# Patient Record
Sex: MALE | Race: WHITE
[De-identification: names, ages, dates, MRNs, and addresses within clinical notes are randomized per-mention and may not be internally consistent; named-entity substitution may affect disease eponyms.]

---

## 2017-08-17 ENCOUNTER — HOSPITAL ENCOUNTER (EMERGENCY)
Dept: HOSPITAL 23 - SED | Age: 77
LOS: 1 days | Discharge: HOME | End: 2017-08-18
Payer: MEDICARE

## 2017-08-17 VITALS — WEIGHT: 204.99 LBS | BODY MASS INDEX: 27.17 KG/M2 | HEIGHT: 73 IN

## 2017-08-17 DIAGNOSIS — E78.5: ICD-10-CM

## 2017-08-17 DIAGNOSIS — N41.9: Primary | ICD-10-CM

## 2017-08-17 DIAGNOSIS — F41.9: ICD-10-CM

## 2017-08-17 DIAGNOSIS — K21.9: ICD-10-CM

## 2017-08-17 LAB
BASOPHIL#: 0.1 X10E3 (ref 0–0.3)
BASOPHIL%: 0.7 % (ref 0–2.5)
BLOOD UREA NITROGEN: 24 MG/DL (ref 9–23)
BUN/CREATININE RATIO: 14.11
CALCIUM SERUM: 9.5 MG/DL (ref 8.4–10.2)
CK MB SERPL-RTO: 15 % (ref 11–15.5)
CK MB SERPL-RTO: 33.6 G/DL (ref 30–36)
CREATININE SERUM: 1.7 MG/DL (ref 0.6–1.4)
DIFF IND: NO
EOSINOPHIL#: 0.3 X10E3 (ref 0–0.7)
EOSINOPHIL%: 1.6 % (ref 0–7)
GENTAMICIN PEAK SERPL-MCNC: YES MG/L
GLOM FILT RATE ESTIMATED: 38.6 ML/MIN (ref 60–?)
GLUCOSE FASTING: 141 MG/DL (ref 70–110)
HEMATOCRIT: 35.9 % (ref 38–50)
HEMOGLOBIN: 12.1 GM/DL (ref 13–16)
KETONES UR QL: 103 MMOL/L (ref 100–111)
KETONES UR QL: 22 MMOL/L (ref 22–31)
LYMPHOCYTE#: 2 X10E3 (ref 1–3.5)
LYMPHOCYTE%: 11.6 % (ref 17–45)
MEAN CELL VOLUME: 90.2 FL (ref 83–96)
MEAN CORPUSCULAR HEMOGLOBIN: 30.3 PG (ref 28–34)
MEAN PLATELET VOLUME: 7.8 FL (ref 6.5–11.5)
MICRO INDICATED?: YES
MONOCYTE#: 1 X10E3 (ref 0–1)
MONOCYTE%: 5.7 % (ref 3–12)
NEUTROPHIL#: 13.7 X10E3 (ref 1.5–7.1)
NEUTROPHIL%: 80.4 % (ref 40–75)
PLATELET COUNT: 225 X10E3 (ref 140–420)
POTASSIUM: 4.3 MMOL/L (ref 3.5–5.1)
RED BLOOD COUNT: 3.98 X10E (ref 3.9–5.6)
SODIUM: 136 MMOL/L (ref 135–145)
URINE APPEARANCE: (no result)
URINE BACTERIA: (no result)
URINE BILIRUBIN: (no result)
URINE BLOOD: (no result)
URINE COLOR: YELLOW
URINE GLUCOSE: (no result) MG/DL
URINE KETONE: (no result)
URINE LEUKOCYTE ESTERASE: (no result)
URINE MUCUS: PRESENT
URINE NITRATE: (no result)
URINE PH: 5 (ref 5–8)
URINE PROTEIN: (no result)
URINE SOURCE: (no result)
URINE SPECIFIC GRAVITY: >=1.03 (ref 1–1.03)
URINE SQUAMOUS EPITHELIAL CELL: (no result) /[HPF]
URINE TRANSITIONAL EPI CELLS: (no result) /[HPF]
URINE UROBILINOGEN: 0.2 MG/DL
URINE WBC: (no result) /[HPF] (ref 0–5)
WHITE BLOOD COUNT: 17.1 X10E3 (ref 4–10.5)

## 2017-08-18 ENCOUNTER — HOSPITAL ENCOUNTER (EMERGENCY)
Dept: HOSPITAL 23 - SED | Age: 77
End: 2017-08-18
Payer: MEDICARE

## 2017-08-18 DIAGNOSIS — Z79.899: ICD-10-CM

## 2017-08-18 DIAGNOSIS — E78.00: ICD-10-CM

## 2017-08-18 DIAGNOSIS — J44.9: ICD-10-CM

## 2017-08-18 DIAGNOSIS — N41.9: ICD-10-CM

## 2017-08-18 DIAGNOSIS — N39.0: Primary | ICD-10-CM

## 2017-08-18 DIAGNOSIS — K21.9: ICD-10-CM

## 2017-08-18 LAB
BASOPHIL#: 0.1 X10E3
BASOPHIL%: 0.5 %
BLOOD UREA NITROGEN: 23 MG/DL
BUN/CREATININE RATIO: 12.1
CALCIUM SERUM: 9.1 MG/DL
CK MB SERPL-RTO: 14.9 %
CK MB SERPL-RTO: 33.7 G/DL
CREATININE SERUM: 1.9 MG/DL
DIFF IND: NO
EOSINOPHIL#: 0.2 X10E3
EOSINOPHIL%: 1.2 %
GENTAMICIN PEAK SERPL-MCNC: YES MG/L
GLOM FILT RATE ESTIMATED: 33.7 ML/MIN
GLUCOSE FASTING: 144 MG/DL
HEMATOCRIT: 33.5 %
HEMOGLOBIN: 11.3 GM/DL
KETONES UR QL: 105 MMOL/L
KETONES UR QL: 23 MMOL/L
LYMPHOCYTE#: 1.3 X10E3
LYMPHOCYTE%: 7.4 %
MEAN CELL VOLUME: 89.7 FL
MEAN CORPUSCULAR HEMOGLOBIN: 30.2 PG
MEAN PLATELET VOLUME: 8 FL
MICRO INDICATED?: YES
MONOCYTE#: 0.9 X10E3
MONOCYTE%: 5.4 %
NEUTROPHIL#: 14.9 X10E3
NEUTROPHIL%: 85.5 %
PLATELET COUNT: 215 X10E3
POTASSIUM: 4.2 MMOL/L
RED BLOOD COUNT: 3.73 X10E
SODIUM: 135 MMOL/L
URINE APPEARANCE: (no result)
URINE BACTERIA: (no result)
URINE BILIRUBIN: (no result)
URINE BLOOD: (no result)
URINE COLOR: YELLOW
URINE GLUCOSE: (no result) MG/DL
URINE KETONE: (no result)
URINE LEUKOCYTE ESTERASE: (no result)
URINE NITRATE: (no result)
URINE PH: 5
URINE PROTEIN: (no result)
URINE RBC: (no result) /[HPF]
URINE SOURCE: (no result)
URINE SPECIFIC GRAVITY: >=1.03
URINE SQUAMOUS EPITHELIAL CELL: (no result) /[HPF]
URINE UROBILINOGEN: 0.2 MG/DL
URINE WBC: (no result) /[HPF]
WHITE BLOOD COUNT: 17.4 X10E3

## 2017-09-19 ENCOUNTER — HOSPITAL ENCOUNTER (OUTPATIENT)
Dept: HOSPITAL 23 - SGUS | Age: 77
Discharge: HOME | End: 2017-09-19
Attending: FAMILY MEDICINE
Payer: MEDICARE

## 2017-09-19 DIAGNOSIS — N39.0: Primary | ICD-10-CM

## 2017-11-30 ENCOUNTER — OFFICE VISIT (OUTPATIENT)
Dept: CARDIOLOGY | Facility: CLINIC | Age: 77
End: 2017-11-30

## 2017-11-30 VITALS — DIASTOLIC BLOOD PRESSURE: 82 MMHG | SYSTOLIC BLOOD PRESSURE: 142 MMHG | WEIGHT: 200 LBS | HEART RATE: 69 BPM

## 2017-11-30 DIAGNOSIS — I25.10 CORONARY ARTERY DISEASE INVOLVING NATIVE HEART WITHOUT ANGINA PECTORIS, UNSPECIFIED VESSEL OR LESION TYPE: Primary | ICD-10-CM

## 2017-11-30 DIAGNOSIS — E78.5 HYPERLIPIDEMIA, UNSPECIFIED HYPERLIPIDEMIA TYPE: ICD-10-CM

## 2017-11-30 DIAGNOSIS — R06.02 SOB (SHORTNESS OF BREATH): ICD-10-CM

## 2017-11-30 DIAGNOSIS — Z72.0 TOBACCO ABUSE: ICD-10-CM

## 2017-11-30 PROCEDURE — 93000 ELECTROCARDIOGRAM COMPLETE: CPT | Performed by: INTERNAL MEDICINE

## 2017-11-30 PROCEDURE — 99204 OFFICE O/P NEW MOD 45 MIN: CPT | Performed by: INTERNAL MEDICINE

## 2017-11-30 RX ORDER — CLOPIDOGREL BISULFATE 75 MG/1
TABLET ORAL
COMMUNITY
Start: 2017-11-02

## 2017-11-30 RX ORDER — QUETIAPINE FUMARATE 100 MG/1
TABLET, FILM COATED ORAL
COMMUNITY
Start: 2017-11-20

## 2017-11-30 RX ORDER — ATORVASTATIN CALCIUM 80 MG/1
TABLET, FILM COATED ORAL
COMMUNITY
Start: 2017-09-25

## 2017-11-30 RX ORDER — ISOSORBIDE MONONITRATE 30 MG/1
TABLET, EXTENDED RELEASE ORAL
COMMUNITY
Start: 2017-11-24

## 2017-11-30 RX ORDER — PAROXETINE HYDROCHLORIDE 20 MG/1
TABLET, FILM COATED ORAL
COMMUNITY
Start: 2017-11-20

## 2017-11-30 RX ORDER — OMEPRAZOLE 40 MG/1
CAPSULE, DELAYED RELEASE ORAL
COMMUNITY
Start: 2017-11-09

## 2017-11-30 RX ORDER — LEVOTHYROXINE SODIUM 0.03 MG/1
TABLET ORAL
COMMUNITY
Start: 2017-11-13

## 2017-12-13 PROBLEM — R06.02 SOB (SHORTNESS OF BREATH): Status: ACTIVE | Noted: 2017-12-13

## 2018-01-02 ENCOUNTER — APPOINTMENT (OUTPATIENT)
Dept: CARDIOLOGY | Facility: HOSPITAL | Age: 78
End: 2018-01-02
Attending: INTERNAL MEDICINE

## 2023-09-29 ENCOUNTER — HOSPITAL ENCOUNTER (INPATIENT)
Facility: HOSPITAL | Age: 83
LOS: 6 days | Discharge: HOME | DRG: 321 | End: 2023-10-05
Attending: INTERNAL MEDICINE | Admitting: INTERNAL MEDICINE
Payer: MEDICARE

## 2023-09-29 ENCOUNTER — APPOINTMENT (OUTPATIENT)
Dept: GENERAL RADIOLOGY | Facility: HOSPITAL | Age: 83
DRG: 321 | End: 2023-09-29
Payer: MEDICARE

## 2023-09-29 DIAGNOSIS — I21.4 NSTEMI (NON-ST ELEVATED MYOCARDIAL INFARCTION): Primary | ICD-10-CM

## 2023-09-29 LAB
ACT BLD: 173 SECONDS (ref 82–152)
ACT BLD: 191 SECONDS (ref 82–152)
ANION GAP SERPL CALCULATED.3IONS-SCNC: 8.3 MMOL/L (ref 5–15)
APTT PPP: 44.4 SECONDS (ref 22.7–35.4)
APTT PPP: 89.8 SECONDS (ref 22.7–35.4)
APTT PPP: 91 SECONDS (ref 22.7–35.4)
ARTERIAL PATENCY WRIST A: POSITIVE
ATMOSPHERIC PRESS: 749.8 MMHG
BASE EXCESS BLDA CALC-SCNC: -4.7 MMOL/L (ref 0–2)
BASOPHILS # BLD AUTO: 0.03 10*3/MM3 (ref 0–0.2)
BASOPHILS NFR BLD AUTO: 0.3 % (ref 0–1.5)
BDY SITE: ABNORMAL
BUN SERPL-MCNC: 31 MG/DL (ref 8–23)
BUN/CREAT SERPL: 12.9 (ref 7–25)
CALCIUM SPEC-SCNC: 9.4 MG/DL (ref 8.6–10.5)
CHLORIDE SERPL-SCNC: 112 MMOL/L (ref 98–107)
CO2 BLDA-SCNC: 22.2 MMOL/L (ref 23–27)
CO2 SERPL-SCNC: 22.7 MMOL/L (ref 22–29)
CREAT BLDA-MCNC: 2.05 MG/DL (ref 0.6–130)
CREAT SERPL-MCNC: 2.4 MG/DL (ref 0.76–1.27)
DEPRECATED RDW RBC AUTO: 50.2 FL (ref 37–54)
DEVICE COMMENT: ABNORMAL
DEVICE COMMENT: ABNORMAL
EGFRCR SERPLBLD CKD-EPI 2021: 26.4 ML/MIN/1.73
EGFRCR SERPLBLD CKD-EPI 2021: 32 ML/MIN/1.73
EOSINOPHIL # BLD AUTO: 0.46 10*3/MM3 (ref 0–0.4)
EOSINOPHIL NFR BLD AUTO: 5.2 % (ref 0.3–6.2)
ERYTHROCYTE [DISTWIDTH] IN BLOOD BY AUTOMATED COUNT: 15.7 % (ref 12.3–15.4)
GAS FLOW AIRWAY: 15 LPM
GEN 5 2HR TROPONIN T REFLEX: 146 NG/L
GLUCOSE SERPL-MCNC: 85 MG/DL (ref 65–99)
HCO3 BLDA-SCNC: 21 MMOL/L (ref 22–28)
HCT VFR BLD AUTO: 31.2 % (ref 37.5–51)
HEMODILUTION: NO
HGB BLD-MCNC: 10 G/DL (ref 13–17.7)
IMM GRANULOCYTES # BLD AUTO: 0.02 10*3/MM3 (ref 0–0.05)
IMM GRANULOCYTES NFR BLD AUTO: 0.2 % (ref 0–0.5)
INR PPP: 1.1 (ref 0.9–1.1)
LYMPHOCYTES # BLD AUTO: 3.21 10*3/MM3 (ref 0.7–3.1)
LYMPHOCYTES NFR BLD AUTO: 36.4 % (ref 19.6–45.3)
MCH RBC QN AUTO: 28.2 PG (ref 26.6–33)
MCHC RBC AUTO-ENTMCNC: 32.1 G/DL (ref 31.5–35.7)
MCV RBC AUTO: 88.1 FL (ref 79–97)
MODALITY: ABNORMAL
MONOCYTES # BLD AUTO: 0.59 10*3/MM3 (ref 0.1–0.9)
MONOCYTES NFR BLD AUTO: 6.7 % (ref 5–12)
NEUTROPHILS NFR BLD AUTO: 4.52 10*3/MM3 (ref 1.7–7)
NEUTROPHILS NFR BLD AUTO: 51.2 % (ref 42.7–76)
NRBC BLD AUTO-RTO: 0 /100 WBC (ref 0–0.2)
PCO2 BLDA: 40 MM HG (ref 35–45)
PH BLDA: 7.33 PH UNITS (ref 7.35–7.45)
PLATELET # BLD AUTO: 207 10*3/MM3 (ref 140–450)
PMV BLD AUTO: 10.3 FL (ref 6–12)
PO2 BLDA: 165.1 MM HG (ref 80–100)
POTASSIUM SERPL-SCNC: 4.4 MMOL/L (ref 3.5–5.2)
PROTHROMBIN TIME: 14.3 SECONDS (ref 11.7–14.2)
QT INTERVAL: 366 MS
QT INTERVAL: 431 MS
QTC INTERVAL: 420 MS
QTC INTERVAL: 459 MS
RBC # BLD AUTO: 3.54 10*6/MM3 (ref 4.14–5.8)
SAO2 % BLDCOA: 99.4 % (ref 92–98.5)
SET MECH RESP RATE: 24
SODIUM SERPL-SCNC: 143 MMOL/L (ref 136–145)
TROPONIN T DELTA: 25 NG/L
TROPONIN T SERPL HS-MCNC: 121 NG/L
WBC NRBC COR # BLD: 8.83 10*3/MM3 (ref 3.4–10.8)

## 2023-09-29 PROCEDURE — C1894 INTRO/SHEATH, NON-LASER: HCPCS | Performed by: INTERNAL MEDICINE

## 2023-09-29 PROCEDURE — 82565 ASSAY OF CREATININE: CPT

## 2023-09-29 PROCEDURE — 93005 ELECTROCARDIOGRAM TRACING: CPT | Performed by: INTERNAL MEDICINE

## 2023-09-29 PROCEDURE — 85347 COAGULATION TIME ACTIVATED: CPT

## 2023-09-29 PROCEDURE — 94799 UNLISTED PULMONARY SVC/PX: CPT

## 2023-09-29 PROCEDURE — 85025 COMPLETE CBC W/AUTO DIFF WBC: CPT | Performed by: INTERNAL MEDICINE

## 2023-09-29 PROCEDURE — 25010000002 FUROSEMIDE PER 20 MG

## 2023-09-29 PROCEDURE — 84484 ASSAY OF TROPONIN QUANT: CPT | Performed by: INTERNAL MEDICINE

## 2023-09-29 PROCEDURE — 25010000002 HEPARIN (PORCINE) PER 1000 UNITS: Performed by: INTERNAL MEDICINE

## 2023-09-29 PROCEDURE — 93458 L HRT ARTERY/VENTRICLE ANGIO: CPT | Performed by: INTERNAL MEDICINE

## 2023-09-29 PROCEDURE — 93010 ELECTROCARDIOGRAM REPORT: CPT | Performed by: INTERNAL MEDICINE

## 2023-09-29 PROCEDURE — C1769 GUIDE WIRE: HCPCS | Performed by: INTERNAL MEDICINE

## 2023-09-29 PROCEDURE — 4A023N7 MEASUREMENT OF CARDIAC SAMPLING AND PRESSURE, LEFT HEART, PERCUTANEOUS APPROACH: ICD-10-PCS | Performed by: INTERNAL MEDICINE

## 2023-09-29 PROCEDURE — 99223 1ST HOSP IP/OBS HIGH 75: CPT | Performed by: INTERNAL MEDICINE

## 2023-09-29 PROCEDURE — 85730 THROMBOPLASTIN TIME PARTIAL: CPT | Performed by: INTERNAL MEDICINE

## 2023-09-29 PROCEDURE — 25010000002 FUROSEMIDE PER 20 MG: Performed by: INTERNAL MEDICINE

## 2023-09-29 PROCEDURE — 25510000001 IOPAMIDOL PER 1 ML: Performed by: INTERNAL MEDICINE

## 2023-09-29 PROCEDURE — 25010000002 MIDAZOLAM PER 1 MG: Performed by: INTERNAL MEDICINE

## 2023-09-29 PROCEDURE — 36600 WITHDRAWAL OF ARTERIAL BLOOD: CPT

## 2023-09-29 PROCEDURE — 80048 BASIC METABOLIC PNL TOTAL CA: CPT | Performed by: INTERNAL MEDICINE

## 2023-09-29 PROCEDURE — 94640 AIRWAY INHALATION TREATMENT: CPT

## 2023-09-29 PROCEDURE — 25010000002 FENTANYL CITRATE (PF) 50 MCG/ML SOLUTION: Performed by: INTERNAL MEDICINE

## 2023-09-29 PROCEDURE — 94761 N-INVAS EAR/PLS OXIMETRY MLT: CPT

## 2023-09-29 PROCEDURE — 71045 X-RAY EXAM CHEST 1 VIEW: CPT

## 2023-09-29 PROCEDURE — 25010000002 HEPARIN (PORCINE) 25000-0.45 UT/250ML-% SOLUTION: Performed by: INTERNAL MEDICINE

## 2023-09-29 PROCEDURE — 85610 PROTHROMBIN TIME: CPT | Performed by: INTERNAL MEDICINE

## 2023-09-29 PROCEDURE — 80299 QUANTITATIVE ASSAY DRUG: CPT | Performed by: NURSE PRACTITIONER

## 2023-09-29 PROCEDURE — 82803 BLOOD GASES ANY COMBINATION: CPT

## 2023-09-29 RX ORDER — HEPARIN SODIUM 5000 [USP'U]/ML
30-60 INJECTION, SOLUTION INTRAVENOUS; SUBCUTANEOUS EVERY 6 HOURS PRN
Status: DISCONTINUED | OUTPATIENT
Start: 2023-09-29 | End: 2023-09-29

## 2023-09-29 RX ORDER — POLYETHYLENE GLYCOL 3350 17 G/17G
17 POWDER, FOR SOLUTION ORAL DAILY PRN
Status: DISCONTINUED | OUTPATIENT
Start: 2023-09-29 | End: 2023-10-05 | Stop reason: HOSPADM

## 2023-09-29 RX ORDER — ISOSORBIDE MONONITRATE 30 MG/1
30 TABLET, EXTENDED RELEASE ORAL DAILY
Status: DISCONTINUED | OUTPATIENT
Start: 2023-09-29 | End: 2023-10-05 | Stop reason: HOSPADM

## 2023-09-29 RX ORDER — FUROSEMIDE 10 MG/ML
80 INJECTION INTRAMUSCULAR; INTRAVENOUS ONCE
Status: COMPLETED | OUTPATIENT
Start: 2023-09-29 | End: 2023-09-29

## 2023-09-29 RX ORDER — HEPARIN SODIUM 1000 [USP'U]/ML
INJECTION, SOLUTION INTRAVENOUS; SUBCUTANEOUS
Status: DISCONTINUED | OUTPATIENT
Start: 2023-09-29 | End: 2023-09-29 | Stop reason: HOSPADM

## 2023-09-29 RX ORDER — ALPRAZOLAM 0.5 MG/1
0.5 TABLET ORAL 2 TIMES DAILY PRN
Status: DISCONTINUED | OUTPATIENT
Start: 2023-09-29 | End: 2023-10-05 | Stop reason: HOSPADM

## 2023-09-29 RX ORDER — LEVOTHYROXINE SODIUM 0.03 MG/1
25 TABLET ORAL DAILY
Status: DISCONTINUED | OUTPATIENT
Start: 2023-09-29 | End: 2023-10-05 | Stop reason: HOSPADM

## 2023-09-29 RX ORDER — IPRATROPIUM BROMIDE AND ALBUTEROL SULFATE 2.5; .5 MG/3ML; MG/3ML
3 SOLUTION RESPIRATORY (INHALATION) ONCE
Status: COMPLETED | OUTPATIENT
Start: 2023-09-29 | End: 2023-09-29

## 2023-09-29 RX ORDER — VERAPAMIL HYDROCHLORIDE 2.5 MG/ML
INJECTION, SOLUTION INTRAVENOUS
Status: DISCONTINUED | OUTPATIENT
Start: 2023-09-29 | End: 2023-09-29 | Stop reason: HOSPADM

## 2023-09-29 RX ORDER — ASPIRIN 81 MG/1
81 TABLET ORAL DAILY
COMMUNITY

## 2023-09-29 RX ORDER — FUROSEMIDE 10 MG/ML
80 INJECTION INTRAMUSCULAR; INTRAVENOUS ONCE
Status: DISCONTINUED | OUTPATIENT
Start: 2023-09-29 | End: 2023-09-29

## 2023-09-29 RX ORDER — SODIUM CHLORIDE 0.9 % (FLUSH) 0.9 %
10 SYRINGE (ML) INJECTION AS NEEDED
Status: DISCONTINUED | OUTPATIENT
Start: 2023-09-29 | End: 2023-10-05 | Stop reason: HOSPADM

## 2023-09-29 RX ORDER — AMLODIPINE BESYLATE 5 MG/1
5 TABLET ORAL DAILY
COMMUNITY
End: 2023-10-05 | Stop reason: HOSPADM

## 2023-09-29 RX ORDER — HYDROCODONE BITARTRATE AND ACETAMINOPHEN 5; 325 MG/1; MG/1
1 TABLET ORAL EVERY 4 HOURS PRN
Status: DISCONTINUED | OUTPATIENT
Start: 2023-09-29 | End: 2023-10-05 | Stop reason: HOSPADM

## 2023-09-29 RX ORDER — FENTANYL CITRATE 50 UG/ML
INJECTION, SOLUTION INTRAMUSCULAR; INTRAVENOUS
Status: DISCONTINUED | OUTPATIENT
Start: 2023-09-29 | End: 2023-09-29 | Stop reason: HOSPADM

## 2023-09-29 RX ORDER — BISACODYL 5 MG/1
5 TABLET, DELAYED RELEASE ORAL DAILY PRN
Status: DISCONTINUED | OUTPATIENT
Start: 2023-09-29 | End: 2023-10-05 | Stop reason: HOSPADM

## 2023-09-29 RX ORDER — SODIUM CHLORIDE 9 MG/ML
INJECTION, SOLUTION INTRAVENOUS
Status: DISCONTINUED | OUTPATIENT
Start: 2023-09-29 | End: 2023-09-29

## 2023-09-29 RX ORDER — FUROSEMIDE 10 MG/ML
INJECTION INTRAMUSCULAR; INTRAVENOUS
Status: COMPLETED
Start: 2023-09-29 | End: 2023-09-29

## 2023-09-29 RX ORDER — BISACODYL 10 MG
10 SUPPOSITORY, RECTAL RECTAL DAILY PRN
Status: DISCONTINUED | OUTPATIENT
Start: 2023-09-29 | End: 2023-10-05 | Stop reason: HOSPADM

## 2023-09-29 RX ORDER — PANTOPRAZOLE SODIUM 40 MG/1
40 TABLET, DELAYED RELEASE ORAL
Status: DISCONTINUED | OUTPATIENT
Start: 2023-09-30 | End: 2023-10-05 | Stop reason: HOSPADM

## 2023-09-29 RX ORDER — IPRATROPIUM BROMIDE AND ALBUTEROL SULFATE 2.5; .5 MG/3ML; MG/3ML
SOLUTION RESPIRATORY (INHALATION)
Status: COMPLETED
Start: 2023-09-29 | End: 2023-09-29

## 2023-09-29 RX ORDER — SODIUM CHLORIDE 9 MG/ML
250 INJECTION, SOLUTION INTRAVENOUS ONCE AS NEEDED
Status: DISCONTINUED | OUTPATIENT
Start: 2023-09-29 | End: 2023-10-05 | Stop reason: HOSPADM

## 2023-09-29 RX ORDER — ZOLPIDEM TARTRATE 10 MG/1
10 TABLET ORAL NIGHTLY
COMMUNITY

## 2023-09-29 RX ORDER — ONDANSETRON 4 MG/1
4 TABLET, FILM COATED ORAL EVERY 6 HOURS PRN
Status: DISCONTINUED | OUTPATIENT
Start: 2023-09-29 | End: 2023-10-05 | Stop reason: HOSPADM

## 2023-09-29 RX ORDER — NITROGLYCERIN 0.4 MG/1
TABLET SUBLINGUAL
Status: COMPLETED
Start: 2023-09-29 | End: 2023-09-29

## 2023-09-29 RX ORDER — LIDOCAINE HYDROCHLORIDE 20 MG/ML
INJECTION, SOLUTION INFILTRATION; PERINEURAL
Status: DISCONTINUED | OUTPATIENT
Start: 2023-09-29 | End: 2023-09-29 | Stop reason: HOSPADM

## 2023-09-29 RX ORDER — HEPARIN SODIUM 10000 [USP'U]/100ML
12 INJECTION, SOLUTION INTRAVENOUS
Status: DISCONTINUED | OUTPATIENT
Start: 2023-09-29 | End: 2023-09-29

## 2023-09-29 RX ORDER — MIDAZOLAM HYDROCHLORIDE 1 MG/ML
INJECTION INTRAMUSCULAR; INTRAVENOUS
Status: DISCONTINUED | OUTPATIENT
Start: 2023-09-29 | End: 2023-09-29 | Stop reason: HOSPADM

## 2023-09-29 RX ORDER — CARVEDILOL 6.25 MG/1
6.25 TABLET ORAL EVERY 12 HOURS SCHEDULED
Status: DISCONTINUED | OUTPATIENT
Start: 2023-09-29 | End: 2023-10-05 | Stop reason: HOSPADM

## 2023-09-29 RX ORDER — SODIUM CHLORIDE 9 MG/ML
100 INJECTION, SOLUTION INTRAVENOUS CONTINUOUS
Status: DISCONTINUED | OUTPATIENT
Start: 2023-09-29 | End: 2023-09-29

## 2023-09-29 RX ORDER — FUROSEMIDE 10 MG/ML
80 INJECTION INTRAMUSCULAR; INTRAVENOUS
Status: DISCONTINUED | OUTPATIENT
Start: 2023-09-29 | End: 2023-09-30

## 2023-09-29 RX ORDER — NITROGLYCERIN 0.4 MG/1
0.4 TABLET SUBLINGUAL
Status: DISCONTINUED | OUTPATIENT
Start: 2023-09-29 | End: 2023-10-05 | Stop reason: HOSPADM

## 2023-09-29 RX ORDER — ONDANSETRON 2 MG/ML
4 INJECTION INTRAMUSCULAR; INTRAVENOUS EVERY 6 HOURS PRN
Status: DISCONTINUED | OUTPATIENT
Start: 2023-09-29 | End: 2023-10-05 | Stop reason: HOSPADM

## 2023-09-29 RX ORDER — ATORVASTATIN CALCIUM 20 MG/1
40 TABLET, FILM COATED ORAL NIGHTLY
Status: DISCONTINUED | OUTPATIENT
Start: 2023-09-29 | End: 2023-10-05 | Stop reason: HOSPADM

## 2023-09-29 RX ORDER — AMLODIPINE BESYLATE 5 MG/1
5 TABLET ORAL DAILY
Status: DISCONTINUED | OUTPATIENT
Start: 2023-09-29 | End: 2023-09-30

## 2023-09-29 RX ORDER — ACETAMINOPHEN 325 MG/1
650 TABLET ORAL EVERY 4 HOURS PRN
Status: DISCONTINUED | OUTPATIENT
Start: 2023-09-29 | End: 2023-10-05 | Stop reason: HOSPADM

## 2023-09-29 RX ORDER — FUROSEMIDE 10 MG/ML
40 INJECTION INTRAMUSCULAR; INTRAVENOUS ONCE
Status: DISCONTINUED | OUTPATIENT
Start: 2023-09-29 | End: 2023-09-29

## 2023-09-29 RX ORDER — SODIUM CHLORIDE 9 MG/ML
40 INJECTION, SOLUTION INTRAVENOUS AS NEEDED
Status: DISCONTINUED | OUTPATIENT
Start: 2023-09-29 | End: 2023-10-05 | Stop reason: HOSPADM

## 2023-09-29 RX ORDER — SODIUM CHLORIDE 0.9 % (FLUSH) 0.9 %
10 SYRINGE (ML) INJECTION EVERY 12 HOURS SCHEDULED
Status: DISCONTINUED | OUTPATIENT
Start: 2023-09-29 | End: 2023-10-05 | Stop reason: HOSPADM

## 2023-09-29 RX ORDER — ZOLPIDEM TARTRATE 5 MG/1
10 TABLET ORAL NIGHTLY
Status: DISCONTINUED | OUTPATIENT
Start: 2023-09-29 | End: 2023-10-05 | Stop reason: HOSPADM

## 2023-09-29 RX ORDER — FUROSEMIDE 10 MG/ML
40 INJECTION INTRAMUSCULAR; INTRAVENOUS
Status: DISCONTINUED | OUTPATIENT
Start: 2023-09-29 | End: 2023-09-29

## 2023-09-29 RX ORDER — ASPIRIN 81 MG/1
81 TABLET ORAL DAILY
Status: DISCONTINUED | OUTPATIENT
Start: 2023-09-29 | End: 2023-10-05 | Stop reason: HOSPADM

## 2023-09-29 RX ORDER — AMOXICILLIN 250 MG
2 CAPSULE ORAL 2 TIMES DAILY
Status: DISCONTINUED | OUTPATIENT
Start: 2023-09-29 | End: 2023-10-05 | Stop reason: HOSPADM

## 2023-09-29 RX ORDER — PAROXETINE HYDROCHLORIDE 20 MG/1
20 TABLET, FILM COATED ORAL DAILY
Status: DISCONTINUED | OUTPATIENT
Start: 2023-09-29 | End: 2023-10-05 | Stop reason: HOSPADM

## 2023-09-29 RX ADMIN — ISOSORBIDE MONONITRATE 30 MG: 30 TABLET, EXTENDED RELEASE ORAL at 20:34

## 2023-09-29 RX ADMIN — ASPIRIN 81 MG: 81 TABLET, COATED ORAL at 18:37

## 2023-09-29 RX ADMIN — FUROSEMIDE 80 MG: 10 INJECTION, SOLUTION INTRAMUSCULAR; INTRAVENOUS at 19:08

## 2023-09-29 RX ADMIN — SODIUM CHLORIDE 100 ML/HR: 9 INJECTION, SOLUTION INTRAVENOUS at 10:07

## 2023-09-29 RX ADMIN — NITROGLYCERIN 0.4 MG: 0.4 TABLET SUBLINGUAL at 18:06

## 2023-09-29 RX ADMIN — Medication 10 ML: at 10:10

## 2023-09-29 RX ADMIN — IPRATROPIUM BROMIDE AND ALBUTEROL SULFATE 3 ML: .5; 2.5 SOLUTION RESPIRATORY (INHALATION) at 13:14

## 2023-09-29 RX ADMIN — NITROGLYCERIN 0.4 MG: 0.4 TABLET SUBLINGUAL at 18:00

## 2023-09-29 RX ADMIN — ATORVASTATIN CALCIUM 40 MG: 20 TABLET, FILM COATED ORAL at 20:34

## 2023-09-29 RX ADMIN — PAROXETINE HYDROCHLORIDE HEMIHYDRATE 20 MG: 20 TABLET, FILM COATED ORAL at 20:33

## 2023-09-29 RX ADMIN — ZOLPIDEM TARTRATE 10 MG: 5 TABLET ORAL at 20:33

## 2023-09-29 RX ADMIN — NITROGLYCERIN 0.4 MG: 0.4 TABLET SUBLINGUAL at 18:37

## 2023-09-29 RX ADMIN — Medication 10 ML: at 20:15

## 2023-09-29 RX ADMIN — FUROSEMIDE 80 MG: 10 INJECTION, SOLUTION INTRAMUSCULAR; INTRAVENOUS at 13:02

## 2023-09-29 RX ADMIN — ALPRAZOLAM 0.5 MG: 0.5 TABLET ORAL at 20:33

## 2023-09-29 RX ADMIN — FUROSEMIDE 80 MG: 10 INJECTION INTRAMUSCULAR; INTRAVENOUS at 13:02

## 2023-09-29 RX ADMIN — IPRATROPIUM BROMIDE AND ALBUTEROL SULFATE 3 ML: 2.5; .5 SOLUTION RESPIRATORY (INHALATION) at 13:14

## 2023-09-29 RX ADMIN — HEPARIN SODIUM 12 UNITS/KG/HR: 10000 INJECTION, SOLUTION INTRAVENOUS at 06:51

## 2023-09-29 RX ADMIN — AMLODIPINE BESYLATE 5 MG: 5 TABLET ORAL at 20:34

## 2023-09-29 NOTE — Clinical Note
First balloon inflation max pressure = 8 john. First balloon inflation duration = 10 seconds. Second inflation of balloon - Max pressure = 14 john. 2nd Inflation of balloon - Duration = 10 seconds. 2nd inflation was done at 10:31 EDT.

## 2023-09-29 NOTE — CONSULTS
[facility] Consult Note      Patient Name: Waldo Jameson  : 1940  MRN: [id number]  Primary Care Physician:  Dahiana Youngblood MD  Referring Physician: No Known Provider  Date of admission: 2023    Subjective     Reason for Consult:  CKD4     HPI:   Waldo Jameson is a 83 y.o. 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  Presented to OSH yesterday with chest pain.  Found to have NSTEMI.  Cr is 2.4, c/w baseline mid 2's.  CXR showed some vascular congestion and very quickly after starting IVF for contrast prophylaxis he became acutely short of breath, rapid response called. IVF stopped and given lasix 80mg IV x1 and he has stabilized.  He's on 2L NC O2 and will go to cath lab soon.  Chronic left ankle edema no worse than usual.  Does not take any diuretics at home.  No n/v or diarrhea or dysuria.    Review of Systems:   14 point review of systems is otherwise negative except for mentioned above on HPI    Personal History     Past Medical History:   Diagnosis Date    Heart attack     Hyperlipidemia        History reviewed. No pertinent surgical history.    Family History: family history is not on file.    Social History:  reports that he has been smoking cigarettes. He has a 32.00 pack-year smoking history. He has never used smokeless tobacco. He reports that he does not drink alcohol and does not use drugs.    Home Medications:  Prior to Admission medications    Medication Sig Start Date End Date Taking? Authorizing Provider   ALPRAZOLAM PO Take 0.5 mg by mouth 2 (Two) Times a Day.   Yes Katie Murray MD   amLODIPine (NORVASC) 5 MG tablet Take 1 tablet by mouth Daily.   Yes ProviderKatie MD   aspirin 81 MG EC tablet Take 1 tablet by mouth Daily.   Yes Katie Murray MD   isosorbide mononitrate (IMDUR) 30 MG 24 hr tablet 1 tablet Daily.  11/24/17  Yes Katie Murary MD   levothyroxine (SYNTHROID, LEVOTHROID) 25 MCG tablet Take 1 tablet by mouth Daily. 11/13/17  Yes Katie Murray MD   omeprazole (priLOSEC) 40 MG capsule Take 1 capsule by mouth Daily. 11/9/17  Yes Katie Murray MD   PARoxetine (PAXIL) 20 MG tablet Take 1 tablet by mouth Daily. 11/20/17  Yes Katie Murray MD   zolpidem (Ambien) 10 MG tablet Take 1 tablet by mouth Every Night.   Yes Katie Murray MD   atorvastatin (LIPITOR) 80 MG tablet  9/25/17 9/29/23  Katie Murray MD   clopidogrel (PLAVIX) 75 MG tablet  11/2/17 9/29/23  Katie Murray MD   QUEtiapine (SEROquel) 100 MG tablet  11/20/17 9/29/23  Katie Murray MD       Allergies:  No Known Allergies    Objective     Vitals:   Temp:  [97.7 °F (36.5 °C)] 97.7 °F (36.5 °C)  Heart Rate:  [71-98] 77  Resp:  [18-26] 24  BP: (130-147)/() 135/101  Flow (L/min):  [2] 2    Intake/Output Summary (Last 24 hours) at 9/29/2023 1443  Last data filed at 9/29/2023 1331  Gross per 24 hour   Intake 0 ml   Output 425 ml   Net -425 ml       Physical Exam:    General Appearance: pleasant elderly WM sitting upright side of bed mildly tachypnic (but better in appearance than earlier per family)   Skin: warm and dry  HEENT: oral mucosa normal, nonicteric sclera  Neck: supple, no JVD  Lungs: Dec BS bibasilar + rales  Heart: RRR, normal S1 and S2  Abdomen: soft, nontender, nondistended  : no palpable bladder  Extremities: trace left ankle edema, no RLE edema, 2+ radial pulses   Neuro: normal speech and mental status     Scheduled Meds:     senna-docusate sodium, 2 tablet, Oral, BID  sodium chloride, 10 mL, Intravenous, Q12H      IV Meds:   heparin, 12 Units/kg/hr, Last Rate: 10 Units/kg/hr (09/29/23 1029)        Results Reviewed:   I have personally reviewed the results from the time of this admission to 9/29/2023 14:43 EDT     Lab Results   Component Value Date    GLUCOSE 85 09/29/2023     CALCIUM 9.4 09/29/2023     09/29/2023    K 4.4 09/29/2023    CO2 22.7 09/29/2023     (H) 09/29/2023    BUN 31 (H) 09/29/2023    CREATININE 2.05 09/29/2023    BCR 12.9 09/29/2023    ANIONGAP 8.3 09/29/2023      Lab Results   Component Value Date    MG 1.8 07/06/2023    ALBUMIN 3.9 11/29/2018           Assessment / Plan     ASSESSMENT:  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  Cr stable 2.4, c/w BL mid 2's, sees Dr Dion Jamison.  Moderate to high risk of contrast nephropathy, including potential need for dialysis should LEONEL occur, not modifiable as he did not tolerate IVF.  Lytes stable.  Vol overload - had some vasc congestion on CXR and may have gone into some flash pulm edema with IVF given briefly for contrast proph, now less distress s/p lasix 80mg IV x1 an hour ago.  On 2L NC O2  NSTEMI, plan for LHC soon, on hepairn drip   HTN - BP spiked some during rapid response, was good 130/82 on admission, takes norvasc alone  Anemia of CKD, hb adequate 10  GERD, on PPI therapy   Hypothyroidism, treated   Hx PVD s/p RLE angioplasty/stent couple mos ago     PLAN:  Proceed with cath assuming able to lie flat  No further IVF   Will give another dose IV lasix tonight and reassess vol status in AM    Addendum: d/w Dr Muñoz re: high filling pressures on cardiac cath, will continue lasix 80mg IV BID for now     Thank you for involving us in the care of Waldo Jameson.  Please feel free to call with any questions.    Alberto Pineda MD  09/29/23  14:43 EDT    [facility]  [phone]

## 2023-09-29 NOTE — CODE DOCUMENTATION
Patient Name:  Waldo Jameson  YOB: 1940  MRN:  [id number]  Admit Date:  9/29/2023    Visit Diagnoses:     ICD-10-CM ICD-9-CM   1. NSTEMI (non-ST elevated myocardial infarction)  I21.4 410.70       Reason For Rapid:   Resp distress.  Labored breathing    RN Communicated With:  Dr Muñoz and Dr Blandon    Rapid Outcome:  Stabilized on unit after IVF DC'd, 80mg lasix given IV, and stat duo neb treatment.     Communication From Rapid Team:   Plan to go to cath lab as planned at 2 pm with Dr Muñoz.     Most Recent Vital Signs  Temp:  [97.7 °F (36.5 °C)] 97.7 °F (36.5 °C)  Heart Rate:  [71-98] 77  Resp:  [18-26] 24  BP: (130-147)/() 135/101  SpO2:  [97 %-100 %] 99 %  on  Flow (L/min):  [2] 2;   Device (Oxygen Therapy): room air    Labs:      No results found for: POCGLU  Site   Date Value Ref Range Status   09/29/2023 Right Radial  Final     Pablo's Test   Date Value Ref Range Status   09/29/2023 Positive  Final     pH, Arterial   Date Value Ref Range Status   09/29/2023 7.328 (L) 7.350 - 7.450 pH units Final     pCO2, Arterial   Date Value Ref Range Status   09/29/2023 40.0 35.0 - 45.0 mm Hg Final     pO2, Arterial   Date Value Ref Range Status   09/29/2023 165.1 (H) 80.0 - 100.0 mm Hg Final     HCO3, Arterial   Date Value Ref Range Status   09/29/2023 21.0 (L) 22.0 - 28.0 mmol/L Final     Base Excess, Arterial   Date Value Ref Range Status   09/29/2023 -4.7 (L) 0.0 - 2.0 mmol/L Final     Comment:     Serial Number: [id number]:  [id number]     O2 Saturation, Arterial   Date Value Ref Range Status   09/29/2023 99.4 (H) 92.0 - 98.5 % Final     CO2 Content   Date Value Ref Range Status   09/29/2023 22.2 (L) 23 - 27 mmol/L Final     Barometric Pressure for Blood Gas   Date Value Ref Range Status   09/29/2023 749.8000 mmHg Final     Modality   Date Value Ref Range Status   09/29/2023 NRB  Final     Results from last 7 days   Lab Units 09/29/23  0811   WBC 10*3/mm3 8.83   HEMOGLOBIN g/dL 10.0*   PLATELETS  10*3/mm3 207     Results from last 7 days   Lab Units 09/29/23  1250 09/29/23  0811   SODIUM mmol/L  --  143   POTASSIUM mmol/L  --  4.4   CHLORIDE mmol/L  --  112*   CO2 mmol/L  --  22.7   BUN mg/dL  --  31*   CREATININE mg/dL 2.05 2.40*   GLUCOSE mg/dL  --  85   CrCl cannot be calculated (Unknown ideal weight.).  Results from last 7 days   Lab Units 09/29/23  1001 09/29/23  0811   HSTROP T ng/L 146* 121*         Results from last 7 days   Lab Units 09/29/23  1250   PH, ARTERIAL pH units 7.328*   PO2 ART mm Hg 165.1*   PCO2, ARTERIAL mm Hg 40.0   HCO3 ART mmol/L 21.0*   O2 SATURATION ART % 99.4*   MODALITY  NRB   No results found for: STREPPNEUAG, LEGANTIGENUR                   Please refer to full rapid documentation on summary page under Index / Code Timeline

## 2023-09-29 NOTE — CASE MANAGEMENT/SOCIAL WORK
Discharge Planning Assessment  [facility]     Patient Name: Waldo Jameson  MRN: [id number]  Today's Date: 9/29/2023    Admit Date: 9/29/2023    Plan: Home, family will transport   Discharge Needs Assessment       Row Name 09/29/23 1146       Living Environment    People in Home spouse    Name(s) of People in Home Mathew Jameson [phone]    Current Living Arrangements home    Potentially Unsafe Housing Conditions none    Primary Care Provided by self    Provides Primary Care For no one    Family Caregiver if Needed spouse    Family Caregiver Names Mathew Jameson    Quality of Family Relationships helpful;involved    Able to Return to Prior Arrangements yes       Resource/Environmental Concerns    Resource/Environmental Concerns none    Transportation Concerns none       Food Insecurity    Within the past 12 months, you worried that your food would run out before you got the money to buy more. Never true    Within the past 12 months, the food you bought just didn't last and you didn't have money to get more. Never true       Transition Planning    Patient/Family Anticipates Transition to home;home with family    Patient/Family Anticipated Services at Transition none    Transportation Anticipated family or friend will provide;car, drives self       Discharge Needs Assessment    Equipment Currently Used at Home none    Concerns to be Addressed no discharge needs identified;denies needs/concerns at this time    Anticipated Changes Related to Illness none    Equipment Needed After Discharge none    Provided Post Acute Provider List? N/A    Provided Post Acute Provider Quality & Resource List? N/A                   Discharge Plan       Row Name 09/29/23 1147       Plan    Plan Home, family will transport    Plan Comments Met with pt at bedside. Introduced self and explained role of . Face sheet verified, PCP is Dahiana Youngblood. Pt denies any difficulty paying for medications and he obtains his  medications from [facility]. Pt stated that if any care needs arise, he spouse or children could assist. Pt is independent in ADL's and he maintains his independence. Pt does not use any assistive devices, nor does he need any at this time. Pt has never used home health or rehab, and does not anticipate requiring those services upon discharge. Explained that CCP would follow to assess for discharge needs.                  Continued Care and Services - Admitted Since 9/29/2023    Coordination has not been started for this encounter.       Expected Discharge Date and Time       Expected Discharge Date Expected Discharge Time    Oct 2, 2023            Demographic Summary    No documentation.                  Functional Status    No documentation.                  Psychosocial    No documentation.                  Abuse/Neglect    No documentation.                  Legal    No documentation.                  Substance Abuse    No documentation.                  Patient Forms    No documentation.                     Megan Tejada RN

## 2023-09-29 NOTE — NURSING NOTE
Pt c/o chest pain 3/10. Prn nitro sl given with relief after the 2nd nitro. Stat EKG done. Paged and spoken to cardiology and noted about the chest pain and EKG on epic no further orders.

## 2023-09-29 NOTE — Clinical Note
First balloon inflation max pressure = 12 john. First balloon inflation duration = 10 seconds. Second inflation of balloon - Max pressure = 12 john. 2nd Inflation of balloon - Duration = 10 seconds. 2nd inflation was done at 10:49 EDT.

## 2023-09-29 NOTE — Clinical Note
A 6 fr sheath was  inserted using micropuncture technique with ultrasound guidance into the right femoral artery. Sheath insertion not delayed. Rt femoral angiogram

## 2023-09-29 NOTE — PLAN OF CARE
Goal Outcome Evaluation:   Received pt from CL rt radial site still with TR band on going management, rt femoral site CDI no bruising or hematoma noted. C/O chest pain 3/10 cardiology noted and total of 3 sl nitro given. Plan of care on going

## 2023-09-29 NOTE — PROGRESS NOTES
Cardiology aware of 3 SL nitro given tonight and patient has cp 3/10. He is not a surgical candidate. We will resume xanax tonight and plan to give isosorbide as scheduled.

## 2023-09-29 NOTE — Clinical Note
First balloon inflation max pressure = 12 john. First balloon inflation duration = 10 seconds. Second inflation of balloon - Max pressure = 6 john. 2nd Inflation of balloon - Duration = 10 seconds. 2nd inflation was done at 10:40 EDT. Third inflation of balloon - Max pressure = 12 john. 3rd Inflation of balloon - Duration = 12 seconds. 3rd inflation was done at 10:41 EDT.

## 2023-09-29 NOTE — Clinical Note
First balloon inflation max pressure = 18 john. First balloon inflation duration = 10 seconds. Second inflation of balloon - Max pressure = 20 john. 2nd Inflation of balloon - Duration = 10 seconds. 2nd inflation was done at 11:20 EDT.

## 2023-09-29 NOTE — Clinical Note
First balloon inflation max pressure = 18 john. First balloon inflation duration = 10 seconds. Second inflation of balloon - Max pressure = 18 john. 2nd Inflation of balloon - Duration = 10 seconds. 2nd inflation was done at 11:11 EDT.

## 2023-09-29 NOTE — Clinical Note
First balloon inflation max pressure = 14 john. First balloon inflation duration = 10 seconds. Second inflation of balloon - Max pressure = 8 john. 2nd Inflation of balloon - Duration = 10 seconds. 2nd inflation was done at 10:46 EDT.

## 2023-09-29 NOTE — CONSULTS
I was consulted on this patient who came to the hospital for shortness of air and then had fluids to prepare him for cardiac catheterization for non-STEMI.  He had acute pulmonary edema.  He has multiple medical issues and is a vasculopath.  He actually looks better than his numbers.  He has an ostial left main lesion and an occluded right.  The right I do not think is bypassable.  The options for him I think are a stent to the left main, treat medically or an off-pump CABG x2.  He is not really interested in open heart surgery and we will see about this over the next couple of days.  I reviewed with Dr. Marti.  I discussed with the patient and family.  We will continue to review his options over the next couple of days.

## 2023-09-29 NOTE — NURSING NOTE
Left message with nephrology service - heart cath needs to be done asap but they need clearance from nephrology first. Please see pt asap. Per service, they will relay message.

## 2023-09-29 NOTE — NURSING NOTE
Pt still has CP 3/10 after 3rd nitro, cardiology aware and Dr Buckner is also aware, he said pt is not a surgery candidate , spoken to cardiology on call and she resume home dose of po xanax for pt

## 2023-09-29 NOTE — H&P
Patient Name: Waldo Jameson  :1940  83 y.o.    Date of Admission: 2023  Date of Consultation:  23  Encounter Provider: Carrie Muñoz MD  Place of Service: [facility] CARDIOLOGY  Referring Provider: No Known Provider  Patient Care Team:  Dahiana Youngblood MD as PCP - General (Family Medicine)      Chief complaint: Non-STEMI    History of Present Illness:  This is an 83-year-old man who presents with chest pain.  He has a history of CKD stage IV, baseline creatinine in the mid 2.  He sees Dion Jamison, due to thrombotic microangiopathy from Seroquel use.  He has MGUS  He has a history of PAD.  2023 he underwent right popliteal angioplasty and right SFA angioplasty and stenting with Dr. Gerard.  This was performed for severe claudication.  He has been treated with aspirin and Plavix.    Yesterday he presented to [facility] with chest pain.  For the preceding week he has had intermittent stuttering pain which was substernal and mild. Yesterday his pain was substernal, heavy, tight, associated with dyspnea and diaphoresis. Relieved by morphine. EKG at that time shows sagging inferior lateral ST depressions.  No EKG available for comparison.     Blood pressure was 180/90, creatinine 2.6, troponin 58 then 201.  BNP 1750 AST ALT normal.  Hemoglobin 10  White count normal.  He was transferred here for further evaluation.  No labs have been drawn yet.  No EKG has been performed yet    Past Medical History:   Diagnosis Date    Heart attack     Hyperlipidemia        History reviewed. No pertinent surgical history.      Prior to Admission medications    Medication Sig Start Date End Date Taking? Authorizing Provider   ALPRAZOLAM PO Take 0.5 mg by mouth 2 (Two) Times a Day.   Yes Katie Murray MD   amLODIPine (NORVASC) 5 MG tablet Take 1 tablet by mouth Daily.   Yes Katie Murray MD   aspirin 81 MG EC tablet Take 1 tablet by mouth Daily.   Yes  Provider, MD Katie   isosorbide mononitrate (IMDUR) 30 MG 24 hr tablet 1 tablet Daily. 11/24/17  Yes Katie Murray MD   levothyroxine (SYNTHROID, LEVOTHROID) 25 MCG tablet Take 1 tablet by mouth Daily. 11/13/17  Yes Katie Murray MD   omeprazole (priLOSEC) 40 MG capsule Take 1 capsule by mouth Daily. 11/9/17  Yes Katie Murray MD   PARoxetine (PAXIL) 20 MG tablet Take 1 tablet by mouth Daily. 11/20/17  Yes Katie Murray MD   zolpidem (Ambien) 10 MG tablet Take 1 tablet by mouth Every Night.   Yes Katie Murray MD   atorvastatin (LIPITOR) 80 MG tablet  9/25/17 9/29/23  Katie Murray MD   clopidogrel (PLAVIX) 75 MG tablet  11/2/17 9/29/23  Katie Murray MD   QUEtiapine (SEROquel) 100 MG tablet  11/20/17 9/29/23  Katie Murray MD       No Known Allergies    Social History     Socioeconomic History    Marital status:    Tobacco Use    Smoking status: Every Day     Packs/day: 0.50     Years: 64.00     Pack years: 32.00     Types: Cigarettes    Smokeless tobacco: Never   Vaping Use    Vaping Use: Never used   Substance and Sexual Activity    Alcohol use: Never    Drug use: Never       History reviewed. No pertinent family history.    REVIEW OF SYSTEMS:   All systems reviewed.  Pertinent positives identified in HPI.  All other systems are negative.      Objective:     Vitals:    09/29/23 0642   BP: 130/82   BP Location: Right arm   Patient Position: Sitting   Pulse: 71   Resp: 18   Temp: 97.7 °F (36.5 °C)   TempSrc: Oral   SpO2: 100%   Weight: 75.2 kg (165 lb 12.6 oz)     There is no height or weight on file to calculate BMI.    General Appearance:    Alert, cooperative, in no acute distress   Head:    Normocephalic, without obvious abnormality, atraumatic   Eyes:            Lids and lashes normal, conjunctivae and sclerae normal, no icterus, no pallor, corneas clear, PERRLA   Ears:    Ears appear intact with no abnormalities noted   Throat:   No  oral lesions, no thrush, oral mucosa moist   Neck:   Left sided carotid bruit   Back:     No kyphosis present, no scoliosis present, no skin lesions, erythema or scars, no tenderness to percussion or palpation, range of motion normal   Lungs:     Clear to auscultation, respirations regular, even and unlabored    Heart:    Regular rhythm and normal rate, normal S1 and S2, no murmur, no gallop, no rub, no click   Chest Wall:    No abnormalities observed   Abdomen:     Normal bowel sounds, no masses, no organomegaly, soft, nontender, nondistended, no guarding, no rebound  tenderness   Extremities:   Moves all extremities well, no edema, no cyanosis, no redness   Pulses:   Pulses palpable and equal bilaterally. Normal radial, carotid, femoral, dorsalis pedis and posterior tibial pulses bilaterally. Normal abdominal aorta   Skin:  Psychiatric:   No bleeding, bruising or rash    Alert and oriented x 3, normal mood and affect   Lab Review:     Results from last 7 days   Lab Units 09/29/23  0811   SODIUM mmol/L 143   POTASSIUM mmol/L 4.4   CHLORIDE mmol/L 112*   CO2 mmol/L 22.7   BUN mg/dL 31*   CREATININE mg/dL 2.40*   CALCIUM mg/dL 9.4   GLUCOSE mg/dL 85     Results from last 7 days   Lab Units 09/29/23  0811   HSTROP T ng/L 121*     Results from last 7 days   Lab Units 09/29/23  0811   WBC 10*3/mm3 8.83   HEMOGLOBIN g/dL 10.0*   HEMATOCRIT % 31.2*   PLATELETS 10*3/mm3 207     Results from last 7 days   Lab Units 09/29/23  0811   INR  1.10   APTT seconds 91.0*                       I personally viewed and interpreted the patient's EKG/Telemetry data.        Assessment and Plan:       NSTEMI Type 1, inferior lateral ST depressions, elevated troponin, vasculopath with recent peripheral angioplasty. Needs Cath today. High risk LEONEL due to CKD. Will consult pt nephrologist Dr. Dion Jamison. Will start NS 100cc/hr for prehydration. Ideally cath will be later today if ok with renal service. Will get echo as well.   2. PAD recent  peripheral angioplasty  3. Occluded right common and internal carotid  4. CKD IV  5. MGUS, Hg 10    Carrie Muñoz MD  09/29/23  08:58 EDT

## 2023-09-29 NOTE — Clinical Note
CARDIOVASCULAR CONSULTATION    REASON FOR CONSULT:   Mirza Do is a 67 y.o. male who presents for eval of LOC/dizziness, HTN, HLP, testing.    PCP: Yinka  HISTORY OF PRESENT ILLNESS:   The patient returns for follow-up of his testing.  His main complaint today is cough.  He otherwise denies angina or dyspnea.  He has had no palpitations, lightheadedness, dizziness, or syncope.  There has been no PND, orthopnea, or lower extremity edema.  He denies melena, hematuria, or claudicant symptoms.    Reviewed the results of his nuclear stress test which were normal.  The patient did have a hypertensive response to treadmill exercise.    CARDIOVASCULAR HISTORY:   PAT (Holter 10/2018)  CMP 45% (echo 10/2018), MPI 10/2018 normal  ?YUNIEL, not on CPAP    PAST MEDICAL HISTORY:     Past Medical History:   Diagnosis Date    CVA (cerebral infarction)     CVA approx 2011/2012    Diabetes mellitus type II, non insulin dependent 10/14/2016    Erectile dysfunction     Erectile Dysfunction    Hyperlipidemia 10/14/2016    Hypertension     Hypertension    Murmur        PAST SURGICAL HISTORY:     Past Surgical History:   Procedure Laterality Date    BACK SURGERY      discectomy    COLONOSCOPY N/A 8/4/2017    Procedure: COLONOSCOPY;  Surgeon: Mario Alberto Vega MD;  Location: [facility] (4TH FLR);  Service: Endoscopy;  Laterality: N/A;  Conflict with [facility]'s schedule no other times available with CRS, Pt requested 8/4/17 - moved to be scoped with GI MD - ER    COLONOSCOPY N/A 8/4/2017    Performed by Mario Alberto Vega MD at [facility] (4TH FLR)    HERNIA REPAIR      ROOT CANAL      UMBILICAL HERNIA REPAIR         ALLERGIES AND MEDICATION:     Review of patient's allergies indicates:   Allergen Reactions    Sulfa (sulfonamide antibiotics)         Medication List           Accurate as of 11/8/18 11:50 AM. If you have any questions, ask your nurse or doctor.               CONTINUE taking these medications    ACCU-CHEK STEPHON PLUS TEST STRP  Post-Procedural Saturation was taken from the Right Hand. Sat result is 90%. Strp  Generic drug:  blood sugar diagnostic  1 strip by Misc.(Non-Drug; Combo Route) route daily as needed (alternate testing time).     ACCU-CHEK FASTCLIX LANCING DEV Misc  Generic drug:  lancets  1 lancet by Misc.(Non-Drug; Combo Route) route daily as needed.     albuterol 90 mcg/actuation inhaler  Commonly known as:  PROVENTIL/VENTOLIN HFA  Inhale 1-2 puffs into the lungs every 6 (six) hours as needed for Wheezing.     aspirin 81 MG Chew     atorvastatin 20 MG tablet  Commonly known as:  LIPITOR  Take 1 tablet (20 mg total) by mouth once daily.     benzonatate 100 MG capsule  Commonly known as:  TESSALON  Take 1 capsule (100 mg total) by mouth 3 (three) times daily as needed for Cough.     gabapentin 300 MG capsule  Commonly known as:  NEURONTIN  Take 1 tab by mouth nightly x 3 days; if no improvement, take 1 tab twice daily x 3 day; if no improvement, take 1 tab 3 times daily     losartan-hydrochlorothiazide 100-25 mg 100-25 mg per tablet  Commonly known as:  HYZAAR  Take 1 tablet by mouth once daily.     VIAGRA 100 MG tablet  Generic drug:  sildenafil  TAKE 1 TABLET BY MOUTH AS NEEDED FOR ERECTILE DYSFUNCTION     zolpidem 10 mg Tab  Commonly known as:  AMBIEN  Take 1 tablet (10 mg total) by mouth nightly as needed.            SOCIAL HISTORY:     Social History     Socioeconomic History    Marital status: Single     Spouse name: Not on file    Number of children: Not on file    Years of education: Not on file    Highest education level: Not on file   Social Needs    Financial resource strain: Not on file    Food insecurity - worry: Not on file    Food insecurity - inability: Not on file    Transportation needs - medical: Not on file    Transportation needs - non-medical: Not on file   Occupational History    Not on file   Tobacco Use    Smoking status: Current Some Day Smoker     Packs/day: 0.10     Years: 4.00     Pack years: 0.40    Smokeless tobacco: Never Used    Tobacco comment: social smoking  as teenager   Substance and Sexual Activity    Alcohol use: Yes     Alcohol/week: 6.0 oz     Types: 10 Cans of beer per week     Comment: occasionally    Drug use: No    Sexual activity: Yes     Partners: Female   Other Topics Concern    Not on file   Social History Narrative           FAMILY HISTORY:     Family History   Problem Relation Age of Onset    Cancer Father     Hypertension Brother     Cancer Brother     No Known Problems Sister     No Known Problems Sister     No Known Problems Sister        REVIEW OF SYSTEMS:   Review of Systems   Constitutional: Negative for chills, diaphoresis and fever.   HENT: Negative for nosebleeds.    Eyes: Negative for blurred vision, double vision and photophobia.   Respiratory: Positive for cough. Negative for hemoptysis, shortness of breath and wheezing.    Cardiovascular: Negative for chest pain, palpitations, orthopnea, claudication, leg swelling and PND.   Gastrointestinal: Negative for abdominal pain, blood in stool, heartburn, melena, nausea and vomiting.   Genitourinary: Negative for flank pain and hematuria.   Musculoskeletal: Negative for falls, myalgias and neck pain.   Skin: Negative for rash.   Neurological: Negative for dizziness, seizures, loss of consciousness, weakness and headaches.   Endo/Heme/Allergies: Negative for polydipsia. Does not bruise/bleed easily.   Psychiatric/Behavioral: Negative for depression and memory loss. The patient is not nervous/anxious.        PHYSICAL EXAM:     Vitals:    11/08/18 1123   BP: (!) 162/100   Pulse: (!) 120   Resp: 16    Body mass index is 40.22 kg/m².  Weight: 120 kg (264 lb 8.8 oz)         Physical Exam   Constitutional: He is oriented to person, place, and time. He appears well-developed and well-nourished. He is cooperative.  Non-toxic appearance. No distress.   HENT:   Head: Normocephalic and atraumatic.   Eyes: Conjunctivae and EOM are normal. Pupils are equal, round, and reactive to  light. No scleral icterus.   Neck: Trachea normal and normal range of motion. Neck supple. Normal carotid pulses and no JVD present. Carotid bruit is not present. No neck rigidity. No tracheal deviation and no edema present. No thyromegaly present.   Cardiovascular: Regular rhythm, S1 normal and S2 normal. Tachycardia present. PMI is not displaced. Exam reveals no gallop and no friction rub.   No murmur heard.  Pulses:       Carotid pulses are 2+ on the right side, and 2+ on the left side.  Pulmonary/Chest: Effort normal and breath sounds normal. No stridor. No respiratory distress. He has no wheezes. He has no rales. He exhibits no tenderness.   Abdominal: Soft. He exhibits no distension. There is no hepatosplenomegaly.   obese   Musculoskeletal: He exhibits no edema or tenderness.   Feet:   Right Foot:   Skin Integrity: Negative for ulcer.   Left Foot:   Skin Integrity: Negative for ulcer.   Neurological: He is alert and oriented to person, place, and time. No cranial nerve deficit.   Skin: Skin is warm and dry. No rash noted. No erythema.   Psychiatric: He has a normal mood and affect. His speech is normal and behavior is normal.   Vitals reviewed.      DATA:   EKG: (personally reviewed tracing)  10/22/18 SR 76, LAD, lat ST abnl ?isch    Laboratory:  CBC:  Recent Labs   Lab 03/04/16  0910 03/19/18  0815 09/25/18  1110   WHITE BLOOD CELL COUNT 5.70 6.36 5.95   HEMOGLOBIN 14.1 13.3 L 13.6 L   HEMATOCRIT 43.8 41.2 42.5   PLATELETS 231 242 235       CHEMISTRIES:  Recent Labs   Lab 11/19/15  0732  06/29/17  1015 03/19/18  0815 09/25/18  1110   GLUCOSE 128 H   < > 96 105 160 H   SODIUM 139   < > 141 140 139   POTASSIUM 3.8   < > 3.8 4.2 3.6   BUN BLD 5 L   < > 11 19 21   CREATININE 0.8   < > 0.8 0.8 1.0   EGFR IF  >60   < > >60.0 >60 >60   EGFR IF NON- >60   < > >60.0 >60 >60   CALCIUM 9.1   < > 9.4 8.9 9.8   MAGNESIUM 2.4  --   --   --   --     < > = values in this interval not  displayed.       CARDIAC BIOMARKERS:  Recent Labs   Lab 11/19/15  0732 11/19/15  1536 11/19/15  1916   TROPONIN I 0.024 0.017 0.020       COAGS:  Recent Labs   Lab 11/19/15  0732   INR 0.9       LIPIDS/LFTS:  Recent Labs   Lab 03/04/16  0910 06/29/17  1015 03/19/18  0815 09/25/18  1110   CHOLESTEROL 224 H 170 188  --    TRIGLYCERIDES 89 94 122  --    HDL 64 49 52  --    LDL CHOLESTEROL 142.2 102.2 111.6  --    NON-HDL CHOLESTEROL 160 121 136  --    AST 26 29 27 24   ALT 40 40 42 39     Lab Results   Component Value Date    HGBA1C 6.3 (H) 09/25/2018     The 10-year ASCVD risk score (Cole HOWELL Jr., et al., 2013) is: 58.6%    Values used to calculate the score:      Age: 67 years      Sex: Male      Is Non- : Yes      Diabetic: Yes      Tobacco smoker: Yes      Systolic Blood Pressure: 162 mmHg      Is BP treated: Yes      HDL Cholesterol: 52 mg/dL      Total Cholesterol: 188 mg/dL      Cardiovascular Testing:  Ex MPI 10/30/18  · Normal exercise MPI  · The perfusion scan is free of evidence from myocardial ischemia or injury.  · Visually estimated LV function is normal.  · Resting wall motion is physiologic.  · The EKG portion of this study is negative for myocardial ischemia. (Tristian 3:50, 5 METS, 110% MPRH, +SOB, -CP/EKG, +HTN)    Holter 10/5/18  Paroxsymal atrial tachycardia noted during monitoring period (140 BPM).  PACs and PVCs also noted.  Diary not returned.    Echo 10/5/18  · Left ventricle ejection fraction is mildly decreased at 45%, mild global hypokinesis.  · Left ventricle shows concentric remodeling.  · Grade I (mild) left ventricular diastolic dysfunction consistent with impaired relaxation.  · Tricuspid valve shows trace regurgitation.  · The estimated PA systolic pressure is 33.60 mm Hg    ASSESSMENT:   # PAT, Holter 10/2018, no sxs during holter period.  Tachycardia/HTN noted today.  # CMP, EF 45% (echo 10/2018).  MPI 10/2018 normal  # abnl EKG  # HTN, uncontrolled  # HLP on  atorva 20mg, pt reports noncompliance  # ?DM  # BMI 40, stable vs last OV  # ?YUNIEL, pt reports possible sleep study several yrs (?[facility]) ago but is not on CPAP    PLAN:   Cont med rx  Add toprol XL 50mg qd and titrate as BP/HR will allow.  Next drug thereafter: amlod +/- aldactone  Diet/exercise/weight loss  YUNIEL eval planned 11/13/18  RTC 3 months with lipids/LFT (mid Feb 2019)      Paul Fernandez MD, FACC

## 2023-09-29 NOTE — Clinical Note
A 6 fr sheath was  inserted using micropuncture technique with ultrasound guidance into the right femoral artery. Sheath insertion not delayed. Angio RFA

## 2023-09-29 NOTE — Clinical Note
Hemostasis started on the right radial artery. R-Band was used in achieving hemostasis. Radial compression device applied to vessel. Hemostasis achieved successfully. Closure device additional comment: 14 cc of air

## 2023-09-30 ENCOUNTER — APPOINTMENT (OUTPATIENT)
Dept: CARDIOLOGY | Facility: HOSPITAL | Age: 83
DRG: 321 | End: 2023-09-30
Payer: MEDICARE

## 2023-09-30 LAB
ANION GAP SERPL CALCULATED.3IONS-SCNC: 11.7 MMOL/L (ref 5–15)
AORTIC DIMENSIONLESS INDEX: 0.6 (DI)
ASCENDING AORTA: 2.6 CM
BASOPHILS # BLD AUTO: 0.03 10*3/MM3 (ref 0–0.2)
BASOPHILS NFR BLD AUTO: 0.4 % (ref 0–1.5)
BH CV ECHO MEAS - ACS: 1.52 CM
BH CV ECHO MEAS - AO MAX PG: 7.7 MMHG
BH CV ECHO MEAS - AO MEAN PG: 3.8 MMHG
BH CV ECHO MEAS - AO V2 MAX: 138.5 CM/SEC
BH CV ECHO MEAS - AO V2 VTI: 29.2 CM
BH CV ECHO MEAS - AVA(I,D): 1.98 CM2
BH CV ECHO MEAS - EDV(CUBED): 157.5 ML
BH CV ECHO MEAS - EDV(MOD-SP2): 183 ML
BH CV ECHO MEAS - EDV(MOD-SP4): 185 ML
BH CV ECHO MEAS - EF(MOD-BP): 35.3 %
BH CV ECHO MEAS - EF(MOD-SP2): 32.2 %
BH CV ECHO MEAS - EF(MOD-SP4): 40 %
BH CV ECHO MEAS - ESV(CUBED): 68.2 ML
BH CV ECHO MEAS - ESV(MOD-SP2): 124 ML
BH CV ECHO MEAS - ESV(MOD-SP4): 111 ML
BH CV ECHO MEAS - FS: 24.3 %
BH CV ECHO MEAS - IVS/LVPW: 1 CM
BH CV ECHO MEAS - IVSD: 0.8 CM
BH CV ECHO MEAS - LAT PEAK E' VEL: 6.7 CM/SEC
BH CV ECHO MEAS - LV DIASTOLIC VOL/BSA (35-75): 97.2 CM2
BH CV ECHO MEAS - LV MASS(C)D: 155 GRAMS
BH CV ECHO MEAS - LV MAX PG: 2.7 MMHG
BH CV ECHO MEAS - LV MEAN PG: 1.41 MMHG
BH CV ECHO MEAS - LV SYSTOLIC VOL/BSA (12-30): 58.3 CM2
BH CV ECHO MEAS - LV V1 MAX: 81.4 CM/SEC
BH CV ECHO MEAS - LV V1 VTI: 18.8 CM
BH CV ECHO MEAS - LVIDD: 5.4 CM
BH CV ECHO MEAS - LVIDS: 4.1 CM
BH CV ECHO MEAS - LVOT AREA: 3.1 CM2
BH CV ECHO MEAS - LVOT DIAM: 1.98 CM
BH CV ECHO MEAS - LVPWD: 0.8 CM
BH CV ECHO MEAS - MED PEAK E' VEL: 3.3 CM/SEC
BH CV ECHO MEAS - MR MAX PG: 68.6 MMHG
BH CV ECHO MEAS - MR MAX VEL: 414 CM/SEC
BH CV ECHO MEAS - MV A DUR: 0.18 SEC
BH CV ECHO MEAS - MV A MAX VEL: 50.1 CM/SEC
BH CV ECHO MEAS - MV DEC SLOPE: 143.7 CM/SEC2
BH CV ECHO MEAS - MV DEC TIME: 349 SEC
BH CV ECHO MEAS - MV E MAX VEL: 60.8 CM/SEC
BH CV ECHO MEAS - MV E/A: 1.21
BH CV ECHO MEAS - MV MAX PG: 1.7 MMHG
BH CV ECHO MEAS - MV MEAN PG: 0.97 MMHG
BH CV ECHO MEAS - MV P1/2T: 130.1 MSEC
BH CV ECHO MEAS - MV V2 VTI: 33 CM
BH CV ECHO MEAS - MVA(P1/2T): 1.69 CM2
BH CV ECHO MEAS - MVA(VTI): 1.75 CM2
BH CV ECHO MEAS - PA ACC TIME: 0.14 SEC
BH CV ECHO MEAS - PA V2 MAX: 82.8 CM/SEC
BH CV ECHO MEAS - PULM A REVS DUR: 0.18 SEC
BH CV ECHO MEAS - PULM A REVS VEL: 38.6 CM/SEC
BH CV ECHO MEAS - PULM DIAS VEL: 26.6 CM/SEC
BH CV ECHO MEAS - PULM S/D: 1.62
BH CV ECHO MEAS - PULM SYS VEL: 43 CM/SEC
BH CV ECHO MEAS - QP/QS: 0.78
BH CV ECHO MEAS - RV MAX PG: 2.1 MMHG
BH CV ECHO MEAS - RV V1 MAX: 72.4 CM/SEC
BH CV ECHO MEAS - RV V1 VTI: 14.6 CM
BH CV ECHO MEAS - RVOT DIAM: 1.98 CM
BH CV ECHO MEAS - SI(MOD-SP2): 31 ML/M2
BH CV ECHO MEAS - SI(MOD-SP4): 38.9 ML/M2
BH CV ECHO MEAS - SUP REN AO DIAM: 2.2 CM
BH CV ECHO MEAS - SV(LVOT): 57.8 ML
BH CV ECHO MEAS - SV(MOD-SP2): 59 ML
BH CV ECHO MEAS - SV(MOD-SP4): 74 ML
BH CV ECHO MEAS - SV(RVOT): 44.9 ML
BH CV ECHO MEAS - TAPSE (>1.6): 1.12 CM
BH CV ECHO MEASUREMENTS AVERAGE E/E' RATIO: 12.16
BH CV XLRA - RV BASE: 2.09 CM
BH CV XLRA - RV LENGTH: 8.9 CM
BH CV XLRA - RV MID: 1.15 CM
BH CV XLRA - TDI S': 6.6 CM/SEC
BH CV XLRA MEAS - DIST GSV CALF DIST LEFT: 0.15 CM
BH CV XLRA MEAS - DIST GSV CALF DIST RIGHT: 0.11 CM
BH CV XLRA MEAS - DIST GSV THIGH DIST LEFT: 0.27 CM
BH CV XLRA MEAS - DIST GSV THIGH DIST RIGHT: 0.2 CM
BH CV XLRA MEAS - DIST LSV CALF DIST LEFT: 0.14 CM
BH CV XLRA MEAS - DIST LSV CALF DIST RIGHT: 0.1 CM
BH CV XLRA MEAS - GSV ANKLE DIST LEFT: 0.15 CM
BH CV XLRA MEAS - GSV ANKLE DIST RIGHT: 0.17 CM
BH CV XLRA MEAS - GSV KNEE DIST LEFT: 0.27 CM
BH CV XLRA MEAS - GSV KNEE DIST RIGHT: 0.16 CM
BH CV XLRA MEAS - GSV ORIGIN DIST LEFT: 0.79 CM
BH CV XLRA MEAS - GSV ORIGIN DIST RIGHT: 0.45 CM
BH CV XLRA MEAS - MID GSV CALF LEFT: 0.1 CM
BH CV XLRA MEAS - MID GSV CALF RIGHT: 0.11 CM
BH CV XLRA MEAS - MID GSV THIGH  LEFT: 0.16 CM
BH CV XLRA MEAS - MID GSV THIGH  RIGHT: 0.22 CM
BH CV XLRA MEAS - MID LSV CALF DIST LEFT: 0.18 CM
BH CV XLRA MEAS - MID LSV CALF DIST RIGHT: 0.18 CM
BH CV XLRA MEAS - PROX GSV CALF DIST LEFT: 0.16 CM
BH CV XLRA MEAS - PROX GSV CALF DIST RIGHT: 0.22 CM
BH CV XLRA MEAS - PROX GSV THIGH  LEFT: 0.29 CM
BH CV XLRA MEAS - PROX GSV THIGH  RIGHT: 0.31 CM
BH CV XLRA MEAS - PROX LSV CALF DIST LEFT: 0.26 CM
BH CV XLRA MEAS - PROX LSV CALF DIST RIGHT: 0.19 CM
BUN SERPL-MCNC: 30 MG/DL (ref 8–23)
BUN/CREAT SERPL: 12.4 (ref 7–25)
CALCIUM SPEC-SCNC: 9.9 MG/DL (ref 8.6–10.5)
CHLORIDE SERPL-SCNC: 104 MMOL/L (ref 98–107)
CO2 SERPL-SCNC: 24.3 MMOL/L (ref 22–29)
CREAT SERPL-MCNC: 2.42 MG/DL (ref 0.76–1.27)
DEPRECATED RDW RBC AUTO: 52.4 FL (ref 37–54)
EGFRCR SERPLBLD CKD-EPI 2021: 26.2 ML/MIN/1.73
EOSINOPHIL # BLD AUTO: 0.63 10*3/MM3 (ref 0–0.4)
EOSINOPHIL NFR BLD AUTO: 8 % (ref 0.3–6.2)
ERYTHROCYTE [DISTWIDTH] IN BLOOD BY AUTOMATED COUNT: 15.8 % (ref 12.3–15.4)
FERRITIN SERPL-MCNC: 58.9 NG/ML (ref 30–400)
GLUCOSE SERPL-MCNC: 93 MG/DL (ref 65–99)
HCT VFR BLD AUTO: 34.7 % (ref 37.5–51)
HGB BLD-MCNC: 11.2 G/DL (ref 13–17.7)
IMM GRANULOCYTES # BLD AUTO: 0.02 10*3/MM3 (ref 0–0.05)
IMM GRANULOCYTES NFR BLD AUTO: 0.3 % (ref 0–0.5)
IRON 24H UR-MRATE: 59 MCG/DL (ref 59–158)
IRON SATN MFR SERPL: 18 % (ref 20–50)
LEFT ATRIUM VOLUME INDEX: 19.2 ML/M2
LYMPHOCYTES # BLD AUTO: 1.95 10*3/MM3 (ref 0.7–3.1)
LYMPHOCYTES NFR BLD AUTO: 24.7 % (ref 19.6–45.3)
MCH RBC QN AUTO: 28.9 PG (ref 26.6–33)
MCHC RBC AUTO-ENTMCNC: 32.3 G/DL (ref 31.5–35.7)
MCV RBC AUTO: 89.7 FL (ref 79–97)
MONOCYTES # BLD AUTO: 0.58 10*3/MM3 (ref 0.1–0.9)
MONOCYTES NFR BLD AUTO: 7.4 % (ref 5–12)
NEUTROPHILS NFR BLD AUTO: 4.67 10*3/MM3 (ref 1.7–7)
NEUTROPHILS NFR BLD AUTO: 59.2 % (ref 42.7–76)
NRBC BLD AUTO-RTO: 0 /100 WBC (ref 0–0.2)
PLATELET # BLD AUTO: 242 10*3/MM3 (ref 140–450)
PMV BLD AUTO: 10.2 FL (ref 6–12)
POTASSIUM SERPL-SCNC: 4.6 MMOL/L (ref 3.5–5.2)
RBC # BLD AUTO: 3.87 10*6/MM3 (ref 4.14–5.8)
SINUS: 3.1 CM
SODIUM SERPL-SCNC: 140 MMOL/L (ref 136–145)
STJ: 2.45 CM
TIBC SERPL-MCNC: 320 MCG/DL (ref 298–536)
TRANSFERRIN SERPL-MCNC: 215 MG/DL (ref 200–360)
WBC NRBC COR # BLD: 7.88 10*3/MM3 (ref 3.4–10.8)

## 2023-09-30 PROCEDURE — 83540 ASSAY OF IRON: CPT | Performed by: INTERNAL MEDICINE

## 2023-09-30 PROCEDURE — 80299 QUANTITATIVE ASSAY DRUG: CPT | Performed by: NURSE PRACTITIONER

## 2023-09-30 PROCEDURE — 84466 ASSAY OF TRANSFERRIN: CPT | Performed by: INTERNAL MEDICINE

## 2023-09-30 PROCEDURE — 85025 COMPLETE CBC W/AUTO DIFF WBC: CPT | Performed by: INTERNAL MEDICINE

## 2023-09-30 PROCEDURE — 93306 TTE W/DOPPLER COMPLETE: CPT

## 2023-09-30 PROCEDURE — 93306 TTE W/DOPPLER COMPLETE: CPT | Performed by: INTERNAL MEDICINE

## 2023-09-30 PROCEDURE — 99232 SBSQ HOSP IP/OBS MODERATE 35: CPT | Performed by: INTERNAL MEDICINE

## 2023-09-30 PROCEDURE — 25010000002 FUROSEMIDE PER 20 MG: Performed by: INTERNAL MEDICINE

## 2023-09-30 PROCEDURE — 80048 BASIC METABOLIC PNL TOTAL CA: CPT | Performed by: INTERNAL MEDICINE

## 2023-09-30 PROCEDURE — 25510000001 PERFLUTREN (DEFINITY) 8.476 MG IN SODIUM CHLORIDE (PF) 0.9 % 10 ML INJECTION: Performed by: INTERNAL MEDICINE

## 2023-09-30 PROCEDURE — 82728 ASSAY OF FERRITIN: CPT | Performed by: INTERNAL MEDICINE

## 2023-09-30 PROCEDURE — 93970 EXTREMITY STUDY: CPT

## 2023-09-30 RX ORDER — FUROSEMIDE 10 MG/ML
40 INJECTION INTRAMUSCULAR; INTRAVENOUS
Status: DISCONTINUED | OUTPATIENT
Start: 2023-09-30 | End: 2023-10-01

## 2023-09-30 RX ADMIN — LEVOTHYROXINE SODIUM 25 MCG: 0.03 TABLET ORAL at 08:03

## 2023-09-30 RX ADMIN — ATORVASTATIN CALCIUM 40 MG: 20 TABLET, FILM COATED ORAL at 21:04

## 2023-09-30 RX ADMIN — SENNOSIDES AND DOCUSATE SODIUM 2 TABLET: 50; 8.6 TABLET ORAL at 08:03

## 2023-09-30 RX ADMIN — CARVEDILOL 6.25 MG: 6.25 TABLET, FILM COATED ORAL at 21:05

## 2023-09-30 RX ADMIN — PERFLUTREN 2 ML: 6.52 INJECTION, SUSPENSION INTRAVENOUS at 11:38

## 2023-09-30 RX ADMIN — CARVEDILOL 6.25 MG: 6.25 TABLET, FILM COATED ORAL at 00:05

## 2023-09-30 RX ADMIN — Medication 10 ML: at 08:03

## 2023-09-30 RX ADMIN — FUROSEMIDE 40 MG: 10 INJECTION, SOLUTION INTRAMUSCULAR; INTRAVENOUS at 17:12

## 2023-09-30 RX ADMIN — ALPRAZOLAM 0.5 MG: 0.5 TABLET ORAL at 21:08

## 2023-09-30 RX ADMIN — ISOSORBIDE MONONITRATE 30 MG: 30 TABLET, EXTENDED RELEASE ORAL at 08:03

## 2023-09-30 RX ADMIN — ZOLPIDEM TARTRATE 10 MG: 5 TABLET ORAL at 21:04

## 2023-09-30 RX ADMIN — PANTOPRAZOLE SODIUM 40 MG: 40 TABLET, DELAYED RELEASE ORAL at 08:03

## 2023-09-30 RX ADMIN — PAROXETINE HYDROCHLORIDE HEMIHYDRATE 20 MG: 20 TABLET, FILM COATED ORAL at 08:03

## 2023-09-30 RX ADMIN — SENNOSIDES AND DOCUSATE SODIUM 2 TABLET: 50; 8.6 TABLET ORAL at 21:04

## 2023-09-30 RX ADMIN — ASPIRIN 81 MG: 81 TABLET, COATED ORAL at 08:03

## 2023-09-30 RX ADMIN — Medication 10 ML: at 21:05

## 2023-09-30 NOTE — PROGRESS NOTES
"    Patient Name: Waldo Jameson  :1940  83 y.o.      Patient Care Team:  Dahiana Youngblood MD as PCP - General (Family Medicine)    Chief Complaint: CAD    Interval History: Looks much improved today, some angina overnight       Objective   Vital Signs  Temp:  [97.7 °F (36.5 °C)-98.2 °F (36.8 °C)] 98.2 °F (36.8 °C)  Heart Rate:  [53-63] 60  Resp:  [15-16] 15  BP: ()/(48-84) 129/78    Intake/Output Summary (Last 24 hours) at 2023 194  Last data filed at 2023 1805  Gross per 24 hour   Intake 960 ml   Output 1750 ml   Net -790 ml     Flowsheet Rows      Flowsheet Row First Filed Value   Admission Height 175.3 cm (69\") Documented at 2023 1137   Admission Weight 75.2 kg (165 lb 12.6 oz) Documented at 2023 0642            Physical Exam:   General Appearance:    Alert, cooperative, in no acute distress   Lungs:     Clear to auscultation.  Normal respiratory effort and rate.      Heart:    Regular rhythm and normal rate, normal S1 and S2, no murmurs, gallops or rubs.     Chest Wall:    No abnormalities observed   Abdomen:     Soft, nontender, positive bowel sounds.     Extremities:   no cyanosis, clubbing or edema.  No marked joint deformities.  Adequate musculoskeletal strength.       Results Review:    Results from last 7 days   Lab Units 23  0349   SODIUM mmol/L 140   POTASSIUM mmol/L 4.6   CHLORIDE mmol/L 104   CO2 mmol/L 24.3   BUN mg/dL 30*   CREATININE mg/dL 2.42*   GLUCOSE mg/dL 93   CALCIUM mg/dL 9.9     Results from last 7 days   Lab Units 23  1001 23  0811   HSTROP T ng/L 146* 121*     Results from last 7 days   Lab Units 23  0349   WBC 10*3/mm3 7.88   HEMOGLOBIN g/dL 11.2*   HEMATOCRIT % 34.7*   PLATELETS 10*3/mm3 242     Results from last 7 days   Lab Units 23  1832 23  1001 23  0811   INR   --   --  1.10   APTT seconds 44.4* 89.8* 91.0*                       Medication Review:   aspirin, 81 mg, Oral, Daily  atorvastatin, 40 " mg, Oral, Nightly  carvedilol, 6.25 mg, Oral, Q12H  furosemide, 40 mg, Intravenous, BID  isosorbide mononitrate, 30 mg, Oral, Daily  levothyroxine, 25 mcg, Oral, Daily  pantoprazole, 40 mg, Oral, Q AM  PARoxetine, 20 mg, Oral, Daily  senna-docusate sodium, 2 tablet, Oral, BID  sodium chloride, 10 mL, Intravenous, Q12H  zolpidem, 10 mg, Oral, Nightly              Assessment & Plan   Severe multivessel cAD involving chronically occluded RCA, Severe ostial LM disease. I think there are risks to both PCI and CABG. He does not have great peripheral vasculature, I worry about being able to have Impella backup for unprotected left main intervention. Can consider off-pump CABG, however he has an occluded carotid and CKD which are not ideal. He has stated he doesn't want CABG but family says they are open to it. Will need to discuss further with the heart team and with renal.   Acute CHF Improving diuretics per renal, LVEDP was severely elevated on 9/29  NADIR on CKD  PAD, occluded right carotid, h/o b/l Iliac stenting    Carrie Muñoz MD  [facility]  09/30/23  19:42 EDT

## 2023-09-30 NOTE — PLAN OF CARE
Goal Outcome Evaluation:   Pt alert and oriented no c/o chest pain today. Vitals stable SR

## 2023-09-30 NOTE — PROGRESS NOTES
[facility] Progress Note      Patient Name: Waldo Jameson  : 1940  MRN: [id number]  Primary Care Physician:  Dahiana Youngblood MD  Date of admission: 2023    Subjective     Interval History:   Patient resting comfortably.  S/p cardiac cath yesterday.  Patient has some dyspnea and cough with expectoration.  Denied any nausea or vomiting    Review of Systems:   As noted above    Objective     Vitals:   Temp:  [97.7 °F (36.5 °C)-98.2 °F (36.8 °C)] 98.2 °F (36.8 °C)  Heart Rate:  [56-98] 56  Resp:  [12-26] 15  BP: ()/() 95/71  Flow (L/min):  [2] 2    Intake/Output Summary (Last 24 hours) at 2023 1002  Last data filed at 2023 0900  Gross per 24 hour   Intake 688 ml   Output 2975 ml   Net -2287 ml       Physical Exam:    General Appearance: NAD, chronically ill-appearing  HEENT: oral mucosa normal, nonicteric sclera  Neck: supple, no JVD  Lungs: Bilateral wheezing  Heart: RRR, normal S1 and S2  Abdomen: soft, nondistended  Extremities: trace edema  Neuro: Awake alert and moving all extremities    Scheduled Meds:     amLODIPine, 5 mg, Oral, Daily  aspirin, 81 mg, Oral, Daily  atorvastatin, 40 mg, Oral, Nightly  carvedilol, 6.25 mg, Oral, Q12H  furosemide, 80 mg, Intravenous, BID  isosorbide mononitrate, 30 mg, Oral, Daily  levothyroxine, 25 mcg, Oral, Daily  pantoprazole, 40 mg, Oral, Q AM  PARoxetine, 20 mg, Oral, Daily  senna-docusate sodium, 2 tablet, Oral, BID  sodium chloride, 10 mL, Intravenous, Q12H  zolpidem, 10 mg, Oral, Nightly      IV Meds:        Results Reviewed:   I have personally reviewed the results from the time of this admission to 2023 10:02 EDT     Results from last 7 days   Lab Units 23  0349 23  1250 23  0811   SODIUM mmol/L 140  --  143   POTASSIUM mmol/L 4.6  --  4.4   CHLORIDE mmol/L 104  --  112*   CO2 mmol/L 24.3  --  22.7   BUN mg/dL 30*  --  31*   CREATININE mg/dL 2.42* 2.05 2.40*   CALCIUM mg/dL  9.9  --  9.4   GLUCOSE mg/dL 93  --  85     CrCl cannot be calculated (Unknown ideal weight.).          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  Cr stable 2.4, c/w BL mid 2's, sees Dr Dion Jamison.  Moderate to high risk of contrast nephropathy, including potential need for dialysis should LEONEL occur, Lytes stable.  Vol overload -hemodynamically stable at this time.  On IV Lasix  NSTEMI, plan for LHC soon, on hepairn drip   HTN -blood pressure running low this morning  Anemia of CKD, hb acceptable  GERD, on PPI therapy   Hypothyroidism, treated   Hx PVD s/p RLE angioplasty/stent couple mos ago   Coronary artery disease.  S/p cardiac cath.  Plan is for CABG versus high risk PCI.  Cardiology evaluating    PLAN:  Discontinue Norvasc  Decreasing Lasix dose also  Reevaluate in morning    Rajan Moncada MD  09/30/23  10:02 EDT    [facility]  [phone]

## 2023-10-01 LAB
ALBUMIN SERPL-MCNC: 3.7 G/DL (ref 3.5–5.2)
ANION GAP SERPL CALCULATED.3IONS-SCNC: 11.8 MMOL/L (ref 5–15)
BASOPHILS # BLD AUTO: 0.04 10*3/MM3 (ref 0–0.2)
BASOPHILS NFR BLD AUTO: 0.5 % (ref 0–1.5)
BUN SERPL-MCNC: 46 MG/DL (ref 8–23)
BUN/CREAT SERPL: 14.6 (ref 7–25)
CALCIUM SPEC-SCNC: 9.8 MG/DL (ref 8.6–10.5)
CHLORIDE SERPL-SCNC: 103 MMOL/L (ref 98–107)
CO2 SERPL-SCNC: 25.2 MMOL/L (ref 22–29)
CREAT SERPL-MCNC: 3.15 MG/DL (ref 0.76–1.27)
DEPRECATED RDW RBC AUTO: 53.1 FL (ref 37–54)
EGFRCR SERPLBLD CKD-EPI 2021: 19.1 ML/MIN/1.73
EOSINOPHIL # BLD AUTO: 0.81 10*3/MM3 (ref 0–0.4)
EOSINOPHIL NFR BLD AUTO: 9.7 % (ref 0.3–6.2)
ERYTHROCYTE [DISTWIDTH] IN BLOOD BY AUTOMATED COUNT: 15.8 % (ref 12.3–15.4)
GLUCOSE SERPL-MCNC: 104 MG/DL (ref 65–99)
HCT VFR BLD AUTO: 36.1 % (ref 37.5–51)
HGB BLD-MCNC: 11.8 G/DL (ref 13–17.7)
IMM GRANULOCYTES # BLD AUTO: 0.02 10*3/MM3 (ref 0–0.05)
IMM GRANULOCYTES NFR BLD AUTO: 0.2 % (ref 0–0.5)
LYMPHOCYTES # BLD AUTO: 3.14 10*3/MM3 (ref 0.7–3.1)
LYMPHOCYTES NFR BLD AUTO: 37.4 % (ref 19.6–45.3)
MCH RBC QN AUTO: 29.5 PG (ref 26.6–33)
MCHC RBC AUTO-ENTMCNC: 32.7 G/DL (ref 31.5–35.7)
MCV RBC AUTO: 90.3 FL (ref 79–97)
MONOCYTES # BLD AUTO: 0.72 10*3/MM3 (ref 0.1–0.9)
MONOCYTES NFR BLD AUTO: 8.6 % (ref 5–12)
NEUTROPHILS NFR BLD AUTO: 3.66 10*3/MM3 (ref 1.7–7)
NEUTROPHILS NFR BLD AUTO: 43.6 % (ref 42.7–76)
NRBC BLD AUTO-RTO: 0 /100 WBC (ref 0–0.2)
PHOSPHATE SERPL-MCNC: 3.8 MG/DL (ref 2.5–4.5)
PLATELET # BLD AUTO: 237 10*3/MM3 (ref 140–450)
PMV BLD AUTO: 10.5 FL (ref 6–12)
POTASSIUM SERPL-SCNC: 4 MMOL/L (ref 3.5–5.2)
QT INTERVAL: 467 MS
QTC INTERVAL: 467 MS
RBC # BLD AUTO: 4 10*6/MM3 (ref 4.14–5.8)
SODIUM SERPL-SCNC: 140 MMOL/L (ref 136–145)
WBC NRBC COR # BLD: 8.39 10*3/MM3 (ref 3.4–10.8)

## 2023-10-01 PROCEDURE — 85025 COMPLETE CBC W/AUTO DIFF WBC: CPT | Performed by: INTERNAL MEDICINE

## 2023-10-01 PROCEDURE — 25010000002 FUROSEMIDE PER 20 MG: Performed by: INTERNAL MEDICINE

## 2023-10-01 PROCEDURE — 99232 SBSQ HOSP IP/OBS MODERATE 35: CPT | Performed by: INTERNAL MEDICINE

## 2023-10-01 PROCEDURE — 80299 QUANTITATIVE ASSAY DRUG: CPT | Performed by: NURSE PRACTITIONER

## 2023-10-01 PROCEDURE — 80069 RENAL FUNCTION PANEL: CPT | Performed by: INTERNAL MEDICINE

## 2023-10-01 RX ORDER — FUROSEMIDE 40 MG/1
40 TABLET ORAL DAILY
Status: DISCONTINUED | OUTPATIENT
Start: 2023-10-01 | End: 2023-10-02

## 2023-10-01 RX ADMIN — ATORVASTATIN CALCIUM 40 MG: 20 TABLET, FILM COATED ORAL at 21:53

## 2023-10-01 RX ADMIN — ZOLPIDEM TARTRATE 10 MG: 5 TABLET ORAL at 21:52

## 2023-10-01 RX ADMIN — ASPIRIN 81 MG: 81 TABLET, COATED ORAL at 09:12

## 2023-10-01 RX ADMIN — Medication 10 ML: at 09:12

## 2023-10-01 RX ADMIN — ALPRAZOLAM 0.5 MG: 0.5 TABLET ORAL at 12:41

## 2023-10-01 RX ADMIN — ACETAMINOPHEN 650 MG: 325 TABLET, FILM COATED ORAL at 14:09

## 2023-10-01 RX ADMIN — PANTOPRAZOLE SODIUM 40 MG: 40 TABLET, DELAYED RELEASE ORAL at 06:07

## 2023-10-01 RX ADMIN — LEVOTHYROXINE SODIUM 25 MCG: 0.03 TABLET ORAL at 09:12

## 2023-10-01 RX ADMIN — PAROXETINE HYDROCHLORIDE HEMIHYDRATE 20 MG: 20 TABLET, FILM COATED ORAL at 09:12

## 2023-10-01 RX ADMIN — CARVEDILOL 6.25 MG: 6.25 TABLET, FILM COATED ORAL at 09:12

## 2023-10-01 RX ADMIN — CARVEDILOL 6.25 MG: 6.25 TABLET, FILM COATED ORAL at 21:53

## 2023-10-01 RX ADMIN — SENNOSIDES AND DOCUSATE SODIUM 2 TABLET: 50; 8.6 TABLET ORAL at 09:12

## 2023-10-01 RX ADMIN — ISOSORBIDE MONONITRATE 30 MG: 30 TABLET, EXTENDED RELEASE ORAL at 09:12

## 2023-10-01 RX ADMIN — ALPRAZOLAM 0.5 MG: 0.5 TABLET ORAL at 21:52

## 2023-10-01 RX ADMIN — Medication 10 ML: at 21:54

## 2023-10-01 NOTE — PROGRESS NOTES
[facility] Progress Note      Patient Name: Waldo Jameson  : 1940  MRN: [id number]  Primary Care Physician:  Dahiana Youngblood MD  Date of admission: 2023    Subjective     Interval History:     Patient resting comfortably.  Denies any chest pain, shortness of breath, nausea or vomiting    Review of Systems:   As noted above    Objective     Vitals:   Temp:  [97.5 °F (36.4 °C)-97.6 °F (36.4 °C)] 97.6 °F (36.4 °C)  Heart Rate:  [53-63] 62  Resp:  [15] 15  BP: ()/() 143/86    Intake/Output Summary (Last 24 hours) at 10/1/2023 1057  Last data filed at 10/1/2023 0958  Gross per 24 hour   Intake 600 ml   Output 1050 ml   Net -450 ml         Physical Exam:    General Appearance: NAD, chronically ill-appearing  HEENT: oral mucosa normal, nonicteric sclera  Neck: supple, no JVD  Lungs: Bilateral wheezing  Heart: RRR, normal S1 and S2  Abdomen: soft, nondistended  Extremities: trace edema  Neuro: Awake alert and moving all extremities    Scheduled Meds:     aspirin, 81 mg, Oral, Daily  atorvastatin, 40 mg, Oral, Nightly  carvedilol, 6.25 mg, Oral, Q12H  furosemide, 40 mg, Intravenous, BID  isosorbide mononitrate, 30 mg, Oral, Daily  levothyroxine, 25 mcg, Oral, Daily  pantoprazole, 40 mg, Oral, Q AM  PARoxetine, 20 mg, Oral, Daily  senna-docusate sodium, 2 tablet, Oral, BID  sodium chloride, 10 mL, Intravenous, Q12H  zolpidem, 10 mg, Oral, Nightly      IV Meds:        Results Reviewed:   I have personally reviewed the results from the time of this admission to 10/1/2023 10:57 EDT     Results from last 7 days   Lab Units 10/01/23  0439 23  0349 23  1250 23  0811   SODIUM mmol/L 140 140  --  143   POTASSIUM mmol/L 4.0 4.6  --  4.4   CHLORIDE mmol/L 103 104  --  112*   CO2 mmol/L 25.2 24.3  --  22.7   BUN mg/dL 46* 30*  --  31*   CREATININE mg/dL 3.15* 2.42* 2.05 2.40*   CALCIUM mg/dL 9.8 9.9  --  9.4   GLUCOSE mg/dL 104* 93  --  85       Estimated  Creatinine Clearance: 19.5 mL/min (A) (by C-G formula based on SCr of 3.15 mg/dL (H)).  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  Creatinine increased to 3.1 from baseline of 2.4 with diuresis.  Electrolytes, volume status okay   Vol overload -hemodynamically stable at this time.  On IV Lasix.  Volume status is improved  NSTEMI/coronary heart disease, s/p cardiac cath.  Cardiology, cardiothoracic surgery following.  Plan is for CABG versus high risk PCI  HTN -blood pressure better.  Norvasc was stopped due to hypotension  Anemia of CKD, hb acceptable  GERD, on PPI therapy   Hypothyroidism, treated   Hx PVD s/p RLE angioplasty/stent couple mos ago       PLAN:  Change Lasix to oral  Repeat labs in a.m.    Rajan Moncada MD  10/01/23  10:57 EDT    [facility]  [phone]

## 2023-10-01 NOTE — PROGRESS NOTES
"    Patient Name: Waldo Jameson  :1940  83 y.o.      Patient Care Team:  Dahiana Youngblood MD as PCP - General (Family Medicine)    Chief Complaint: CAD    Interval History: Looks much improved today, no further angina or dyspnea       Objective   Vital Signs  Temp:  [97.5 °F (36.4 °C)-97.6 °F (36.4 °C)] 97.6 °F (36.4 °C)  Heart Rate:  [59-63] 62  Resp:  [15] 15  BP: ()/() 143/86    Intake/Output Summary (Last 24 hours) at 10/1/2023 1203  Last data filed at 10/1/2023 0958  Gross per 24 hour   Intake 600 ml   Output 1050 ml   Net -450 ml     Flowsheet Rows      Flowsheet Row First Filed Value   Admission Height 175.3 cm (69\") Documented at 2023 1137   Admission Weight 75.2 kg (165 lb 12.6 oz) Documented at 2023 0642            Physical Exam:   General Appearance:    Alert, cooperative, in no acute distress   Lungs:     Clear to auscultation.  Normal respiratory effort and rate.      Heart:    Regular rhythm and normal rate, normal S1 and S2, no murmurs, gallops or rubs.     Chest Wall:    No abnormalities observed   Abdomen:     Soft, nontender, positive bowel sounds.     Extremities:   no cyanosis, clubbing or edema.  No marked joint deformities.  Adequate musculoskeletal strength.       Results Review:    Results from last 7 days   Lab Units 10/01/23  0439   SODIUM mmol/L 140   POTASSIUM mmol/L 4.0   CHLORIDE mmol/L 103   CO2 mmol/L 25.2   BUN mg/dL 46*   CREATININE mg/dL 3.15*   GLUCOSE mg/dL 104*   CALCIUM mg/dL 9.8     Results from last 7 days   Lab Units 23  1001 23  0811   HSTROP T ng/L 146* 121*     Results from last 7 days   Lab Units 10/01/23  0439   WBC 10*3/mm3 8.39   HEMOGLOBIN g/dL 11.8*   HEMATOCRIT % 36.1*   PLATELETS 10*3/mm3 237     Results from last 7 days   Lab Units 23  1832 23  1001 23  0811   INR   --   --  1.10   APTT seconds 44.4* 89.8* 91.0*                       Medication Review:   aspirin, 81 mg, Oral, " Daily  atorvastatin, 40 mg, Oral, Nightly  carvedilol, 6.25 mg, Oral, Q12H  furosemide, 40 mg, Oral, Daily  isosorbide mononitrate, 30 mg, Oral, Daily  levothyroxine, 25 mcg, Oral, Daily  pantoprazole, 40 mg, Oral, Q AM  PARoxetine, 20 mg, Oral, Daily  senna-docusate sodium, 2 tablet, Oral, BID  sodium chloride, 10 mL, Intravenous, Q12H  zolpidem, 10 mg, Oral, Nightly              Assessment & Plan   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 Pending discussion regarding revascularization with off-pump  bypass vs LM PCI with Impella support vs medical management. None of these options are ideal.   Acute CHF Improving diuretics per renal, LVEDP was severely elevated on 9/29  NADIR on CKD  PAD, occluded right carotid, h/o b/l Iliac stenting    Agree with decreasing diuretic. Unfortunately renal function has risen. Will repeat CXR tomorrow.   Will discuss further with Dr. Rowland  tomorrow.     Carrie Muñoz MD  [facility]  10/01/23  12:03 EDT

## 2023-10-01 NOTE — PROGRESS NOTES
Still having a little bit of angina.  I would show this to Dr. PAUL about an off-pump procedure.  We are still considering.

## 2023-10-02 LAB
ALBUMIN SERPL-MCNC: 3.6 G/DL (ref 3.5–5.2)
ANION GAP SERPL CALCULATED.3IONS-SCNC: 12.8 MMOL/L (ref 5–15)
BUN SERPL-MCNC: 44 MG/DL (ref 8–23)
BUN/CREAT SERPL: 16.7 (ref 7–25)
CALCIUM SPEC-SCNC: 9.4 MG/DL (ref 8.6–10.5)
CHLORIDE SERPL-SCNC: 104 MMOL/L (ref 98–107)
CO2 SERPL-SCNC: 22.2 MMOL/L (ref 22–29)
CREAT SERPL-MCNC: 2.64 MG/DL (ref 0.76–1.27)
DEPRECATED RDW RBC AUTO: 50.2 FL (ref 37–54)
EGFRCR SERPLBLD CKD-EPI 2021: 23.6 ML/MIN/1.73
ERYTHROCYTE [DISTWIDTH] IN BLOOD BY AUTOMATED COUNT: 15.6 % (ref 12.3–15.4)
GLUCOSE SERPL-MCNC: 82 MG/DL (ref 65–99)
HCT VFR BLD AUTO: 32.5 % (ref 37.5–51)
HGB BLD-MCNC: 10.5 G/DL (ref 13–17.7)
MCH RBC QN AUTO: 28.5 PG (ref 26.6–33)
MCHC RBC AUTO-ENTMCNC: 32.3 G/DL (ref 31.5–35.7)
MCV RBC AUTO: 88.1 FL (ref 79–97)
PHOSPHATE SERPL-MCNC: 3.4 MG/DL (ref 2.5–4.5)
PLATELET # BLD AUTO: 232 10*3/MM3 (ref 140–450)
PMV BLD AUTO: 10.5 FL (ref 6–12)
POTASSIUM SERPL-SCNC: 4.1 MMOL/L (ref 3.5–5.2)
RBC # BLD AUTO: 3.69 10*6/MM3 (ref 4.14–5.8)
SODIUM SERPL-SCNC: 139 MMOL/L (ref 136–145)
WBC NRBC COR # BLD: 8.67 10*3/MM3 (ref 3.4–10.8)

## 2023-10-02 PROCEDURE — B2111ZZ FLUOROSCOPY OF MULTIPLE CORONARY ARTERIES USING LOW OSMOLAR CONTRAST: ICD-10-PCS | Performed by: INTERNAL MEDICINE

## 2023-10-02 PROCEDURE — 027034Z DILATION OF CORONARY ARTERY, ONE ARTERY WITH DRUG-ELUTING INTRALUMINAL DEVICE, PERCUTANEOUS APPROACH: ICD-10-PCS | Performed by: INTERNAL MEDICINE

## 2023-10-02 PROCEDURE — 80069 RENAL FUNCTION PANEL: CPT | Performed by: INTERNAL MEDICINE

## 2023-10-02 PROCEDURE — 80299 QUANTITATIVE ASSAY DRUG: CPT | Performed by: NURSE PRACTITIONER

## 2023-10-02 PROCEDURE — 99232 SBSQ HOSP IP/OBS MODERATE 35: CPT | Performed by: INTERNAL MEDICINE

## 2023-10-02 PROCEDURE — 4A023N7 MEASUREMENT OF CARDIAC SAMPLING AND PRESSURE, LEFT HEART, PERCUTANEOUS APPROACH: ICD-10-PCS | Performed by: INTERNAL MEDICINE

## 2023-10-02 PROCEDURE — B240ZZ3 ULTRASONOGRAPHY OF SINGLE CORONARY ARTERY, INTRAVASCULAR: ICD-10-PCS | Performed by: INTERNAL MEDICINE

## 2023-10-02 PROCEDURE — 85027 COMPLETE CBC AUTOMATED: CPT | Performed by: INTERNAL MEDICINE

## 2023-10-02 RX ORDER — SODIUM CHLORIDE 9 MG/ML
75 INJECTION, SOLUTION INTRAVENOUS CONTINUOUS
Status: ACTIVE | OUTPATIENT
Start: 2023-10-03 | End: 2023-10-03

## 2023-10-02 RX ORDER — CLOPIDOGREL BISULFATE 75 MG/1
600 TABLET ORAL ONCE
Status: COMPLETED | OUTPATIENT
Start: 2023-10-02 | End: 2023-10-02

## 2023-10-02 RX ORDER — CLOPIDOGREL BISULFATE 75 MG/1
75 TABLET ORAL DAILY
Status: DISCONTINUED | OUTPATIENT
Start: 2023-10-03 | End: 2023-10-05 | Stop reason: HOSPADM

## 2023-10-02 RX ADMIN — LEVOTHYROXINE SODIUM 25 MCG: 0.03 TABLET ORAL at 09:02

## 2023-10-02 RX ADMIN — ZOLPIDEM TARTRATE 10 MG: 5 TABLET ORAL at 21:28

## 2023-10-02 RX ADMIN — Medication 10 ML: at 09:02

## 2023-10-02 RX ADMIN — ASPIRIN 81 MG: 81 TABLET, COATED ORAL at 09:02

## 2023-10-02 RX ADMIN — CARVEDILOL 6.25 MG: 6.25 TABLET, FILM COATED ORAL at 09:02

## 2023-10-02 RX ADMIN — FUROSEMIDE 40 MG: 40 TABLET ORAL at 09:02

## 2023-10-02 RX ADMIN — SENNOSIDES AND DOCUSATE SODIUM 2 TABLET: 50; 8.6 TABLET ORAL at 21:28

## 2023-10-02 RX ADMIN — PAROXETINE HYDROCHLORIDE HEMIHYDRATE 20 MG: 20 TABLET, FILM COATED ORAL at 09:02

## 2023-10-02 RX ADMIN — PANTOPRAZOLE SODIUM 40 MG: 40 TABLET, DELAYED RELEASE ORAL at 06:05

## 2023-10-02 RX ADMIN — CARVEDILOL 6.25 MG: 6.25 TABLET, FILM COATED ORAL at 21:29

## 2023-10-02 RX ADMIN — ATORVASTATIN CALCIUM 40 MG: 20 TABLET, FILM COATED ORAL at 21:28

## 2023-10-02 RX ADMIN — Medication 10 ML: at 21:00

## 2023-10-02 RX ADMIN — CLOPIDOGREL BISULFATE 600 MG: 75 TABLET, FILM COATED ORAL at 13:59

## 2023-10-02 RX ADMIN — ALPRAZOLAM 0.5 MG: 0.5 TABLET ORAL at 21:32

## 2023-10-02 RX ADMIN — ISOSORBIDE MONONITRATE 30 MG: 30 TABLET, EXTENDED RELEASE ORAL at 09:02

## 2023-10-02 NOTE — PLAN OF CARE
Goal Outcome Evaluation:  Plan of Care Reviewed With: patient, family           Outcome Evaluation: NSR on monitor, c/o of dizziness when ambulating in [place] today and some shortness of air with activity. 2L oxygen applied and up with assistance, no c/o chest pain. Plan for LM PCI tomorrow. Plavix loaded today. Care ongoing.

## 2023-10-02 NOTE — PROGRESS NOTES
[facility] Progress Note      Patient Name: Waldo Jameson  : 1940  MRN: [id number]  Primary Care Physician:  Dahiana Youngblood MD  Date of admission: 2023    Subjective     Interval History:   F/u NADIR CKD4    Review of Systems:   UOP NR  Denies dyspnea or nausea    Objective     Vitals:   Temp:  [97.6 °F (36.4 °C)-98 °F (36.7 °C)] 98 °F (36.7 °C)  Heart Rate:  [55-63] 58  Resp:  [15-16] 16  BP: ()/() 91/77    Intake/Output Summary (Last 24 hours) at 10/2/2023 0749  Last data filed at 10/1/2023 1842  Gross per 24 hour   Intake 840 ml   Output --   Net 840 ml       Physical Exam:    General Appearance: frail WM comfortable/alert  Neck: supple, no JVD  Lungs: CTA bilat no rales  Heart: RRR, normal S1 and S2  Abdomen: soft, nontender, nondistended  Extremities: no edema, cyanosis or clubbing      Scheduled Meds:     aspirin, 81 mg, Oral, Daily  atorvastatin, 40 mg, Oral, Nightly  carvedilol, 6.25 mg, Oral, Q12H  furosemide, 40 mg, Oral, Daily  isosorbide mononitrate, 30 mg, Oral, Daily  levothyroxine, 25 mcg, Oral, Daily  pantoprazole, 40 mg, Oral, Q AM  PARoxetine, 20 mg, Oral, Daily  senna-docusate sodium, 2 tablet, Oral, BID  sodium chloride, 10 mL, Intravenous, Q12H  zolpidem, 10 mg, Oral, Nightly      IV Meds:        Results Reviewed:   I have personally reviewed the results from the time of this admission to 10/2/2023 07:49 EDT     Results from last 7 days   Lab Units 10/02/23  0336 10/01/23  0439 23  0349 23  1250 23  0811   SODIUM mmol/L 139 140 140  --  143   POTASSIUM mmol/L 4.1 4.0 4.6  --  4.4   CHLORIDE mmol/L 104 103 104  --  112*   CO2 mmol/L  --  25.2 24.3  --  22.7   BUN mg/dL 44* 46* 30*  --  31*   CREATININE mg/dL 2.64* 3.15* 2.42*   < > 2.40*   CALCIUM mg/dL 9.4 9.8 9.9  --  9.4   GLUCOSE mg/dL 82 104* 93  --  85    < > = values in this interval not displayed.     Estimated Creatinine Clearance: 23.2 mL/min (A) (by C-G  formula based on SCr of 2.64 mg/dL (H)).  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  NADIR represents prerenal azotemia due to diuresis, Cr improved 3.1 to 2.6 with de-escalation diuretic IV to PO.  Lytes/volume stable  Vol overload - improved with diuresis; no dyspnea or edema currently, switched to PO lasix 40mg daily yesterday  NSTEMI/coronary heart disease, s/p cardiac cath.  Cardiology, cardiothoracic surgery following.  Plan is for CABG versus high risk PCI  HTN - BP labile, low at times prompting cessation of norvasc, on coreg alone  Anemia of CKD, hb stable 10.5  GERD, on PPI therapy   Hypothyroidism, treated   Hx PVD s/p RLE angioplasty/stent couple mos ago     PLAN:  Continue PO lasix     Addendum: note high risk for CABG so PCI planned for tomorrow - hold diuretic in AM.  I think he would tolerate gentle proph IVF starting in AM to reduce LEONEL risks.      Alberto Pineda MD  10/02/23  07:49 EDT    [facility]  [phone]

## 2023-10-02 NOTE — PROGRESS NOTES
" LOS: 3 days   Patient Care Team:  Dahiana Youngblood MD as PCP - General (Family Medicine)    Chief Complaint: CAD    Subjective:  Symptoms:  No shortness of breath, cough or chest pain.    Diet:  Adequate intake.  No nausea or vomiting.    Activity level: Returning to normal.    Pain:  He complains of pain that is mild.  Pain is well controlled.        Vital Signs  Temp:  [97.6 °F (36.4 °C)-98.1 °F (36.7 °C)] 98.1 °F (36.7 °C)  Heart Rate:  [55-68] 68  Resp:  [15-16] 16  BP: ()/(51-77) 127/61  Body mass index is 24.37 kg/m².    Intake/Output Summary (Last 24 hours) at 10/2/2023 1016  Last data filed at 10/2/2023 0916  Gross per 24 hour   Intake 820 ml   Output --   Net 820 ml     I/O this shift:  In: 220 [P.O.:220]  Out: -           09/29/23  0642 09/30/23  1137   Weight: 75.2 kg (165 lb 12.6 oz) 74.8 kg (165 lb)       Objective:  General Appearance:  Comfortable and in no acute distress.    Vital signs: (most recent): Blood pressure 131/66, pulse 55, temperature 98.3 °F (36.8 °C), temperature source Oral, resp. rate 16, height 175.3 cm (69\"), weight 74.8 kg (165 lb), SpO2 97 %.  Vital signs are normal.  No fever.    Output: Producing urine.    Lungs:  Normal effort and normal respiratory rate.    Heart: Normal rate.  Regular rhythm.    Abdomen: Abdomen is soft.  Bowel sounds are normal.     Extremities: There is no dependent edema.    Pulses: Distal pulses are intact.    Neurological: Patient is alert and oriented to person, place and time.    Skin:  Warm and dry.          Results Review:        WBC WBC   Date Value Ref Range Status   10/02/2023 8.67 3.40 - 10.80 10*3/mm3 Final   10/01/2023 8.39 3.40 - 10.80 10*3/mm3 Final   09/30/2023 7.88 3.40 - 10.80 10*3/mm3 Final      HGB Hemoglobin   Date Value Ref Range Status   10/02/2023 10.5 (L) 13.0 - 17.7 g/dL Final   10/01/2023 11.8 (L) 13.0 - 17.7 g/dL Final   09/30/2023 11.2 (L) 13.0 - 17.7 g/dL Final      HCT Hematocrit   Date Value Ref Range Status "   10/02/2023 32.5 (L) 37.5 - 51.0 % Final   10/01/2023 36.1 (L) 37.5 - 51.0 % Final   09/30/2023 34.7 (L) 37.5 - 51.0 % Final      Platelets Platelets   Date Value Ref Range Status   10/02/2023 232 140 - 450 10*3/mm3 Final   10/01/2023 237 140 - 450 10*3/mm3 Final   09/30/2023 242 140 - 450 10*3/mm3 Final        PT/INR:  No results found for: PROTIME/No results found for: INR    Sodium Sodium   Date Value Ref Range Status   10/02/2023 139 136 - 145 mmol/L Preliminary   10/01/2023 140 136 - 145 mmol/L Final   09/30/2023 140 136 - 145 mmol/L Final      Potassium Potassium   Date Value Ref Range Status   10/02/2023 4.1 3.5 - 5.2 mmol/L Preliminary   10/01/2023 4.0 3.5 - 5.2 mmol/L Final   09/30/2023 4.6 3.5 - 5.2 mmol/L Final      Chloride Chloride   Date Value Ref Range Status   10/02/2023 104 98 - 107 mmol/L Preliminary   10/01/2023 103 98 - 107 mmol/L Final   09/30/2023 104 98 - 107 mmol/L Final      Bicarbonate CO2   Date Value Ref Range Status   10/01/2023 25.2 22.0 - 29.0 mmol/L Final   09/30/2023 24.3 22.0 - 29.0 mmol/L Final      BUN BUN   Date Value Ref Range Status   10/02/2023 44 (H) 8 - 23 mg/dL Preliminary   10/01/2023 46 (H) 8 - 23 mg/dL Final   09/30/2023 30 (H) 8 - 23 mg/dL Final      Creatinine Creatinine   Date Value Ref Range Status   10/02/2023 2.64 (H) 0.76 - 1.27 mg/dL Preliminary   10/01/2023 3.15 (H) 0.76 - 1.27 mg/dL Final   09/30/2023 2.42 (H) 0.76 - 1.27 mg/dL Final   09/29/2023 2.05 0.60 - 130.00 mg/dL Final     Comment:     Serial Number: [id number]  [id number]      Calcium Calcium   Date Value Ref Range Status   10/02/2023 9.4 8.6 - 10.5 mg/dL Preliminary   10/01/2023 9.8 8.6 - 10.5 mg/dL Final   09/30/2023 9.9 8.6 - 10.5 mg/dL Final      Magnesium No results found for: MG       aspirin, 81 mg, Oral, Daily  atorvastatin, 40 mg, Oral, Nightly  carvedilol, 6.25 mg, Oral, Q12H  furosemide, 40 mg, Oral, Daily  isosorbide mononitrate, 30 mg, Oral, Daily  levothyroxine, 25 mcg, Oral,  Daily  pantoprazole, 40 mg, Oral, Q AM  PARoxetine, 20 mg, Oral, Daily  senna-docusate sodium, 2 tablet, Oral, BID  sodium chloride, 10 mL, Intravenous, Q12H  zolpidem, 10 mg, Oral, Nightly         Assessment & Plan  - NSTEMI/multi-vessel CAD  - acute HFrEF--EF 35% per echo  - PVD with previous iliac stenting and recent right popliteal angioplasty and right SFA angioplasty and stenting (Dr. Gerard 7/24/23)  - occluded right carotid artery  - NADIR on CKD IV--nephrology following  - hypertension  - anemia of chronic disease  - hypothyroidism--on levothyroxine  - GERD--on PPI    No chest pain or shortness of breath overnight  Creatinine improved to 2.64 today. Continued on oral diuretic per renal  Dr. Wilson has reviewed the films and given age, frailty and co-morbidities he feels that patient would be high risk for surgery and recommends proceeding with PCI  This was discussed with the patient and TYREE Salinas  10/02/23  10:16 EDT

## 2023-10-02 NOTE — PROGRESS NOTES
"    Patient Name: Waldo Jameson  :1940  83 y.o.      Patient Care Team:  Dahiana Youngblood MD as PCP - General (Family Medicine)    Chief Complaint: CAD    Interval History: Cr improved. No angina.      Objective   Vital Signs  Temp:  [97.6 °F (36.4 °C)-98.3 °F (36.8 °C)] 98.3 °F (36.8 °C)  Heart Rate:  [55-68] 55  Resp:  [15-16] 16  BP: ()/(56-77) 131/66    Intake/Output Summary (Last 24 hours) at 10/2/2023 1251  Last data filed at 10/2/2023 0916  Gross per 24 hour   Intake 820 ml   Output --   Net 820 ml     Flowsheet Rows      Flowsheet Row First Filed Value   Admission Height 175.3 cm (69\") Documented at 2023 1137   Admission Weight 75.2 kg (165 lb 12.6 oz) Documented at 2023 0642            Physical Exam:   General Appearance:    Alert, cooperative, in no acute distress   Lungs:     Clear to auscultation.  Normal respiratory effort and rate.      Heart:    Regular rhythm and normal rate, normal S1 and S2, no murmurs, gallops or rubs.     Chest Wall:    No abnormalities observed   Abdomen:     Soft, nontender, positive bowel sounds.     Extremities:   no cyanosis, clubbing or edema.  No marked joint deformities.  Adequate musculoskeletal strength.       Results Review:    Results from last 7 days   Lab Units 10/02/23  0336 10/01/23  0439   SODIUM mmol/L 139 140   POTASSIUM mmol/L 4.1 4.0   CHLORIDE mmol/L 104 103   CO2 mmol/L  --  25.2   BUN mg/dL 44* 46*   CREATININE mg/dL 2.64* 3.15*   GLUCOSE mg/dL 82 104*   CALCIUM mg/dL 9.4 9.8     Results from last 7 days   Lab Units 23  1001 23  0811   HSTROP T ng/L 146* 121*     Results from last 7 days   Lab Units 10/02/23  0336   WBC 10*3/mm3 8.67   HEMOGLOBIN g/dL 10.5*   HEMATOCRIT % 32.5*   PLATELETS 10*3/mm3 232     Results from last 7 days   Lab Units 23  1832 23  1001 23  0811   INR   --   --  1.10   APTT seconds 44.4* 89.8* 91.0*                       Medication Review:   aspirin, 81 mg, Oral, " Daily  atorvastatin, 40 mg, Oral, Nightly  carvedilol, 6.25 mg, Oral, Q12H  clopidogrel, 600 mg, Oral, Once  [START ON 10/3/2023] clopidogrel, 75 mg, Oral, Daily  furosemide, 40 mg, Oral, Daily  isosorbide mononitrate, 30 mg, Oral, Daily  levothyroxine, 25 mcg, Oral, Daily  pantoprazole, 40 mg, Oral, Q AM  PARoxetine, 20 mg, Oral, Daily  senna-docusate sodium, 2 tablet, Oral, BID  sodium chloride, 10 mL, Intravenous, Q12H  zolpidem, 10 mg, Oral, Nightly              Assessment & Plan   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 Pending discussion regarding revascularization with off-pump  bypass vs LM PCI with Impella support vs medical management. None of these options are ideal.   Acute CHF Improving diuretics per renal, LVEDP was severely elevated on 9/29  NADIR on CKD  PAD, occluded right carotid, h/o b/l Iliac stenting    Plan LM intervention tomorrow.     Carrie Muñoz MD  [facility]  10/02/23  12:51 EDT

## 2023-10-02 NOTE — H&P (VIEW-ONLY)
"    Patient Name: Waldo Jameson  :1940  83 y.o.      Patient Care Team:  Dahiana Youngblood MD as PCP - General (Family Medicine)    Chief Complaint: CAD    Interval History: Cr improved. No angina.      Objective   Vital Signs  Temp:  [97.6 °F (36.4 °C)-98.3 °F (36.8 °C)] 98.3 °F (36.8 °C)  Heart Rate:  [55-68] 55  Resp:  [15-16] 16  BP: ()/(56-77) 131/66    Intake/Output Summary (Last 24 hours) at 10/2/2023 1251  Last data filed at 10/2/2023 0916  Gross per 24 hour   Intake 820 ml   Output --   Net 820 ml     Flowsheet Rows      Flowsheet Row First Filed Value   Admission Height 175.3 cm (69\") Documented at 2023 1137   Admission Weight 75.2 kg (165 lb 12.6 oz) Documented at 2023 0642            Physical Exam:   General Appearance:    Alert, cooperative, in no acute distress   Lungs:     Clear to auscultation.  Normal respiratory effort and rate.      Heart:    Regular rhythm and normal rate, normal S1 and S2, no murmurs, gallops or rubs.     Chest Wall:    No abnormalities observed   Abdomen:     Soft, nontender, positive bowel sounds.     Extremities:   no cyanosis, clubbing or edema.  No marked joint deformities.  Adequate musculoskeletal strength.       Results Review:    Results from last 7 days   Lab Units 10/02/23  0336 10/01/23  0439   SODIUM mmol/L 139 140   POTASSIUM mmol/L 4.1 4.0   CHLORIDE mmol/L 104 103   CO2 mmol/L  --  25.2   BUN mg/dL 44* 46*   CREATININE mg/dL 2.64* 3.15*   GLUCOSE mg/dL 82 104*   CALCIUM mg/dL 9.4 9.8     Results from last 7 days   Lab Units 23  1001 23  0811   HSTROP T ng/L 146* 121*     Results from last 7 days   Lab Units 10/02/23  0336   WBC 10*3/mm3 8.67   HEMOGLOBIN g/dL 10.5*   HEMATOCRIT % 32.5*   PLATELETS 10*3/mm3 232     Results from last 7 days   Lab Units 23  1832 23  1001 23  0811   INR   --   --  1.10   APTT seconds 44.4* 89.8* 91.0*                       Medication Review:   aspirin, 81 mg, Oral, " Daily  atorvastatin, 40 mg, Oral, Nightly  carvedilol, 6.25 mg, Oral, Q12H  clopidogrel, 600 mg, Oral, Once  [START ON 10/3/2023] clopidogrel, 75 mg, Oral, Daily  furosemide, 40 mg, Oral, Daily  isosorbide mononitrate, 30 mg, Oral, Daily  levothyroxine, 25 mcg, Oral, Daily  pantoprazole, 40 mg, Oral, Q AM  PARoxetine, 20 mg, Oral, Daily  senna-docusate sodium, 2 tablet, Oral, BID  sodium chloride, 10 mL, Intravenous, Q12H  zolpidem, 10 mg, Oral, Nightly              Assessment & Plan   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 Pending discussion regarding revascularization with off-pump  bypass vs LM PCI with Impella support vs medical management. None of these options are ideal.   Acute CHF Improving diuretics per renal, LVEDP was severely elevated on 9/29  NADIR on CKD  PAD, occluded right carotid, h/o b/l Iliac stenting    Plan LM intervention tomorrow.     Carrie Muñoz MD  [facility]  10/02/23  12:51 EDT

## 2023-10-03 LAB
ACT BLD: 167 SECONDS (ref 82–152)
ACT BLD: 197 SECONDS (ref 82–152)
ACT BLD: 215 SECONDS (ref 82–152)
ACT BLD: 245 SECONDS (ref 82–152)
ACT BLD: 245 SECONDS (ref 82–152)
ACT BLD: 257 SECONDS (ref 82–152)
ACT BLD: 311 SECONDS (ref 82–152)
ANION GAP SERPL CALCULATED.3IONS-SCNC: 13 MMOL/L (ref 5–15)
BUN SERPL-MCNC: 49 MG/DL (ref 8–23)
BUN/CREAT SERPL: 19.3 (ref 7–25)
CALCIUM SPEC-SCNC: 9.5 MG/DL (ref 8.6–10.5)
CHLORIDE SERPL-SCNC: 104 MMOL/L (ref 98–107)
CO2 SERPL-SCNC: 24 MMOL/L (ref 22–29)
CREAT SERPL-MCNC: 2.54 MG/DL (ref 0.76–1.27)
DEPRECATED RDW RBC AUTO: 51.5 FL (ref 37–54)
EGFRCR SERPLBLD CKD-EPI 2021: 24.7 ML/MIN/1.73
ERYTHROCYTE [DISTWIDTH] IN BLOOD BY AUTOMATED COUNT: 15.8 % (ref 12.3–15.4)
GLUCOSE BLDC GLUCOMTR-MCNC: 88 MG/DL (ref 70–130)
GLUCOSE SERPL-MCNC: 86 MG/DL (ref 65–99)
HCT VFR BLD AUTO: 34.5 % (ref 37.5–51)
HGB BLD-MCNC: 11.4 G/DL (ref 13–17.7)
MCH RBC QN AUTO: 29.4 PG (ref 26.6–33)
MCHC RBC AUTO-ENTMCNC: 33 G/DL (ref 31.5–35.7)
MCV RBC AUTO: 88.9 FL (ref 79–97)
PLATELET # BLD AUTO: 260 10*3/MM3 (ref 140–450)
PMV BLD AUTO: 10.6 FL (ref 6–12)
POTASSIUM SERPL-SCNC: 3.8 MMOL/L (ref 3.5–5.2)
RBC # BLD AUTO: 3.88 10*6/MM3 (ref 4.14–5.8)
SODIUM SERPL-SCNC: 141 MMOL/L (ref 136–145)
WBC NRBC COR # BLD: 9.16 10*3/MM3 (ref 3.4–10.8)

## 2023-10-03 PROCEDURE — 93458 L HRT ARTERY/VENTRICLE ANGIO: CPT | Performed by: INTERNAL MEDICINE

## 2023-10-03 PROCEDURE — C1725 CATH, TRANSLUMIN NON-LASER: HCPCS | Performed by: INTERNAL MEDICINE

## 2023-10-03 PROCEDURE — C1894 INTRO/SHEATH, NON-LASER: HCPCS | Performed by: INTERNAL MEDICINE

## 2023-10-03 PROCEDURE — 92978 ENDOLUMINL IVUS OCT C 1ST: CPT | Performed by: INTERNAL MEDICINE

## 2023-10-03 PROCEDURE — 25010000002 HEPARIN (PORCINE) PER 1000 UNITS: Performed by: INTERNAL MEDICINE

## 2023-10-03 PROCEDURE — 99152 MOD SED SAME PHYS/QHP 5/>YRS: CPT | Performed by: INTERNAL MEDICINE

## 2023-10-03 PROCEDURE — 25010000002 MIDAZOLAM PER 1 MG: Performed by: INTERNAL MEDICINE

## 2023-10-03 PROCEDURE — C1887 CATHETER, GUIDING: HCPCS | Performed by: INTERNAL MEDICINE

## 2023-10-03 PROCEDURE — 99153 MOD SED SAME PHYS/QHP EA: CPT | Performed by: INTERNAL MEDICINE

## 2023-10-03 PROCEDURE — C1753 CATH, INTRAVAS ULTRASOUND: HCPCS | Performed by: INTERNAL MEDICINE

## 2023-10-03 PROCEDURE — C1769 GUIDE WIRE: HCPCS | Performed by: INTERNAL MEDICINE

## 2023-10-03 PROCEDURE — 25810000003 SODIUM CHLORIDE 0.9 % SOLUTION: Performed by: INTERNAL MEDICINE

## 2023-10-03 PROCEDURE — C1874 STENT, COATED/COV W/DEL SYS: HCPCS | Performed by: INTERNAL MEDICINE

## 2023-10-03 PROCEDURE — 99232 SBSQ HOSP IP/OBS MODERATE 35: CPT | Performed by: INTERNAL MEDICINE

## 2023-10-03 PROCEDURE — 25510000001 IOPAMIDOL PER 1 ML: Performed by: INTERNAL MEDICINE

## 2023-10-03 PROCEDURE — C9600 PERC DRUG-EL COR STENT SING: HCPCS | Performed by: INTERNAL MEDICINE

## 2023-10-03 PROCEDURE — 82948 REAGENT STRIP/BLOOD GLUCOSE: CPT

## 2023-10-03 PROCEDURE — 80048 BASIC METABOLIC PNL TOTAL CA: CPT | Performed by: INTERNAL MEDICINE

## 2023-10-03 PROCEDURE — 25010000002 FENTANYL CITRATE (PF) 50 MCG/ML SOLUTION: Performed by: INTERNAL MEDICINE

## 2023-10-03 PROCEDURE — 92928 PRQ TCAT PLMT NTRAC ST 1 LES: CPT | Performed by: INTERNAL MEDICINE

## 2023-10-03 PROCEDURE — 25010000002 HYDRALAZINE PER 20 MG: Performed by: INTERNAL MEDICINE

## 2023-10-03 PROCEDURE — 85347 COAGULATION TIME ACTIVATED: CPT

## 2023-10-03 PROCEDURE — 85027 COMPLETE CBC AUTOMATED: CPT | Performed by: INTERNAL MEDICINE

## 2023-10-03 PROCEDURE — 80299 QUANTITATIVE ASSAY DRUG: CPT | Performed by: NURSE PRACTITIONER

## 2023-10-03 DEVICE — XIENCE SKYPOINT™ EVEROLIMUS ELUTING CORONARY STENT SYSTEM 4.00 MM X 12 MM / RAPID-EXCHANGE
Type: IMPLANTABLE DEVICE | Site: CORONARY | Status: FUNCTIONAL
Brand: XIENCE SKYPOINT™

## 2023-10-03 RX ORDER — FENTANYL CITRATE 50 UG/ML
INJECTION, SOLUTION INTRAMUSCULAR; INTRAVENOUS
Status: DISCONTINUED | OUTPATIENT
Start: 2023-10-03 | End: 2023-10-03 | Stop reason: HOSPADM

## 2023-10-03 RX ORDER — HEPARIN SODIUM 1000 [USP'U]/ML
INJECTION, SOLUTION INTRAVENOUS; SUBCUTANEOUS
Status: DISCONTINUED | OUTPATIENT
Start: 2023-10-03 | End: 2023-10-03 | Stop reason: HOSPADM

## 2023-10-03 RX ORDER — ACETAMINOPHEN 325 MG/1
650 TABLET ORAL EVERY 4 HOURS PRN
Status: DISCONTINUED | OUTPATIENT
Start: 2023-10-03 | End: 2023-10-05 | Stop reason: HOSPADM

## 2023-10-03 RX ORDER — LIDOCAINE HYDROCHLORIDE 20 MG/ML
INJECTION, SOLUTION INFILTRATION; PERINEURAL
Status: DISCONTINUED | OUTPATIENT
Start: 2023-10-03 | End: 2023-10-03 | Stop reason: HOSPADM

## 2023-10-03 RX ORDER — SODIUM CHLORIDE 9 MG/ML
250 INJECTION, SOLUTION INTRAVENOUS ONCE AS NEEDED
Status: DISCONTINUED | OUTPATIENT
Start: 2023-10-03 | End: 2023-10-05 | Stop reason: HOSPADM

## 2023-10-03 RX ORDER — SODIUM CHLORIDE 9 MG/ML
1 INJECTION, SOLUTION INTRAVENOUS CONTINUOUS
Status: ACTIVE | OUTPATIENT
Start: 2023-10-03 | End: 2023-10-03

## 2023-10-03 RX ORDER — MIDAZOLAM HYDROCHLORIDE 1 MG/ML
INJECTION INTRAMUSCULAR; INTRAVENOUS
Status: DISCONTINUED | OUTPATIENT
Start: 2023-10-03 | End: 2023-10-03 | Stop reason: HOSPADM

## 2023-10-03 RX ORDER — NITROGLYCERIN 0.4 MG/1
0.4 TABLET SUBLINGUAL
Status: DISCONTINUED | OUTPATIENT
Start: 2023-10-03 | End: 2023-10-05 | Stop reason: HOSPADM

## 2023-10-03 RX ORDER — HYDRALAZINE HYDROCHLORIDE 20 MG/ML
20 INJECTION INTRAMUSCULAR; INTRAVENOUS ONCE
Status: COMPLETED | OUTPATIENT
Start: 2023-10-03 | End: 2023-10-03

## 2023-10-03 RX ADMIN — ATORVASTATIN CALCIUM 40 MG: 20 TABLET, FILM COATED ORAL at 20:21

## 2023-10-03 RX ADMIN — LEVOTHYROXINE SODIUM 25 MCG: 0.03 TABLET ORAL at 09:26

## 2023-10-03 RX ADMIN — SODIUM CHLORIDE 1 ML/KG/HR: 9 INJECTION, SOLUTION INTRAVENOUS at 15:21

## 2023-10-03 RX ADMIN — CLOPIDOGREL BISULFATE 75 MG: 75 TABLET, FILM COATED ORAL at 09:26

## 2023-10-03 RX ADMIN — SODIUM CHLORIDE 1 ML/KG/HR: 9 INJECTION, SOLUTION INTRAVENOUS at 12:36

## 2023-10-03 RX ADMIN — ALPRAZOLAM 0.5 MG: 0.5 TABLET ORAL at 16:17

## 2023-10-03 RX ADMIN — ACETAMINOPHEN 650 MG: 325 TABLET, FILM COATED ORAL at 17:08

## 2023-10-03 RX ADMIN — Medication 10 ML: at 20:21

## 2023-10-03 RX ADMIN — CARVEDILOL 6.25 MG: 6.25 TABLET, FILM COATED ORAL at 20:21

## 2023-10-03 RX ADMIN — ASPIRIN 81 MG: 81 TABLET, COATED ORAL at 09:26

## 2023-10-03 RX ADMIN — HYDRALAZINE HYDROCHLORIDE 20 MG: 20 INJECTION, SOLUTION INTRAMUSCULAR; INTRAVENOUS at 17:03

## 2023-10-03 RX ADMIN — PAROXETINE HYDROCHLORIDE HEMIHYDRATE 20 MG: 20 TABLET, FILM COATED ORAL at 09:25

## 2023-10-03 RX ADMIN — ZOLPIDEM TARTRATE 10 MG: 5 TABLET ORAL at 20:21

## 2023-10-03 RX ADMIN — Medication 10 ML: at 12:37

## 2023-10-03 RX ADMIN — CARVEDILOL 6.25 MG: 6.25 TABLET, FILM COATED ORAL at 09:26

## 2023-10-03 RX ADMIN — ISOSORBIDE MONONITRATE 30 MG: 30 TABLET, EXTENDED RELEASE ORAL at 09:25

## 2023-10-03 RX ADMIN — SODIUM CHLORIDE 75 ML/HR: 9 INJECTION, SOLUTION INTRAVENOUS at 06:04

## 2023-10-03 NOTE — PLAN OF CARE
Goal Outcome Evaluation:  Plan of Care Reviewed With: patient        Progress: no change  Outcome Evaluation: A/O, no complaints of pain, ambulated in room, IV fluids to start at 0600, possible PCI today, NPO, VSS.

## 2023-10-03 NOTE — PROGRESS NOTES
"    Patient Name: Waldo Jameson  :1940  83 y.o.      Patient Care Team:  Dahiana Youngblood MD as PCP - General (Family Medicine)    Chief Complaint: CAD    Interval History: Tolerated PCI     Objective   Vital Signs  Temp:  [97.6 °F (36.4 °C)-98.2 °F (36.8 °C)] 97.6 °F (36.4 °C)  Heart Rate:  [51-67] 51  Resp:  [11-18] 16  BP: (112-168)/(60-81) 112/73    Intake/Output Summary (Last 24 hours) at 10/3/2023 1342  Last data filed at 10/3/2023 1237  Gross per 24 hour   Intake 360 ml   Output 325 ml   Net 35 ml     Flowsheet Rows      Flowsheet Row First Filed Value   Admission Height 175.3 cm (69\") Documented at 2023 1137   Admission Weight 75.2 kg (165 lb 12.6 oz) Documented at 2023 0642            Physical Exam:   General Appearance:    Alert, cooperative, in no acute distress   Lungs:     Clear to auscultation.  Normal respiratory effort and rate.      Heart:    Regular rhythm and normal rate, normal S1 and S2, no murmurs, gallops or rubs.     Chest Wall:    No abnormalities observed   Abdomen:     Soft, nontender, positive bowel sounds.     Extremities:   no cyanosis, clubbing or edema.  No marked joint deformities.  Adequate musculoskeletal strength.       Results Review:    Results from last 7 days   Lab Units 10/03/23  0357   SODIUM mmol/L 141   POTASSIUM mmol/L 3.8   CHLORIDE mmol/L 104   CO2 mmol/L 24.0   BUN mg/dL 49*   CREATININE mg/dL 2.54*   GLUCOSE mg/dL 86   CALCIUM mg/dL 9.5     Results from last 7 days   Lab Units 23  1001 23  0811   HSTROP T ng/L 146* 121*     Results from last 7 days   Lab Units 10/03/23  0357   WBC 10*3/mm3 9.16   HEMOGLOBIN g/dL 11.4*   HEMATOCRIT % 34.5*   PLATELETS 10*3/mm3 260     Results from last 7 days   Lab Units 23  1832 23  1001 23  0811   INR   --   --  1.10   APTT seconds 44.4* 89.8* 91.0*                       Medication Review:   aspirin, 81 mg, Oral, Daily  atorvastatin, 40 mg, Oral, Nightly  carvedilol, 6.25 mg, " Oral, Q12H  clopidogrel, 75 mg, Oral, Daily  isosorbide mononitrate, 30 mg, Oral, Daily  levothyroxine, 25 mcg, Oral, Daily  pantoprazole, 40 mg, Oral, Q AM  PARoxetine, 20 mg, Oral, Daily  senna-docusate sodium, 2 tablet, Oral, BID  sodium chloride, 10 mL, Intravenous, Q12H  zolpidem, 10 mg, Oral, Nightly         sodium chloride, 75 mL/hr, Last Rate: Stopped (10/03/23 1237)  sodium chloride, 1 mL/kg/hr, Last Rate: 1 mL/kg/hr (10/03/23 1236)        Assessment & Plan   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 Pending discussion regarding revascularization with off-pump  bypass vs LM PCI with Impella support vs medical management. None of these options are ideal.   Acute CHF Improving diuretics per renal, LVEDP was severely elevated on 9/29  NADIR on CKD  PAD, occluded right carotid, h/o b/l Iliac stenting    Successful PCI of the LM  Bedrest x 6 hours after sheath pulled  Monitor in CCU overnight  Tx to floor tomorrow, home soon.   LVEDP 5, hold diuresis today. Received 500cc NS in the procedure. Will have 100cc/h4 x 4 hours.     Carrie Muñoz MD  [facility]  10/03/23  13:42 EDT

## 2023-10-03 NOTE — PROGRESS NOTES
[facility] Progress Note      Patient Name: Waldo Jameson  : 1940  MRN: [id number]  Primary Care Physician:  Dahiana Youngblood MD  Date of admission: 2023    Subjective     Interval History:   Follow-up NADIR on CKD 4.  Status post left main PCI this morning.  LVEDP 5.   denies pain.  Hungry and thirsty.    Review of Systems:   As noted above    Objective     Vitals:   Temp:  [97.6 °F (36.4 °C)-98.2 °F (36.8 °C)] 97.6 °F (36.4 °C)  Heart Rate:  [51-67] 51  Resp:  [11-18] 16  BP: (112-168)/(60-81) 112/73  Flow (L/min):  [2-15] 10    Intake/Output Summary (Last 24 hours) at 10/3/2023 1430  Last data filed at 10/3/2023 1237  Gross per 24 hour   Intake 360 ml   Output 325 ml   Net 35 ml       Physical Exam:    General Appearance: alert, oriented x 3, no acute distress   Skin: warm and dry  HEENT: oral mucosa dry, nonicteric sclera  Neck: supple, no JVD  Lungs: Clear to auscultation  Heart: RRR, no S3 or rub  Abdomen: soft, nontender, nondistended.  +bs  : no palpable bladder  Extremities: no edema, sheath in place.  Neuro: normal speech and mental status     Scheduled Meds:     aspirin, 81 mg, Oral, Daily  atorvastatin, 40 mg, Oral, Nightly  carvedilol, 6.25 mg, Oral, Q12H  clopidogrel, 75 mg, Oral, Daily  isosorbide mononitrate, 30 mg, Oral, Daily  levothyroxine, 25 mcg, Oral, Daily  pantoprazole, 40 mg, Oral, Q AM  PARoxetine, 20 mg, Oral, Daily  senna-docusate sodium, 2 tablet, Oral, BID  sodium chloride, 10 mL, Intravenous, Q12H  zolpidem, 10 mg, Oral, Nightly      IV Meds:   sodium chloride, 1 mL/kg/hr, Last Rate: 1 mL/kg/hr (10/03/23 1236)        Results Reviewed:   I have personally reviewed the results from the time of this admission to 10/3/2023 14:30 EDT     Results from last 7 days   Lab Units 10/03/23  0357 10/02/23  0336 10/01/23  0439   SODIUM mmol/L 141 139 140   POTASSIUM mmol/L 3.8 4.1 4.0   CHLORIDE mmol/L 104 104 103   CO2 mmol/L 24.0 22.2 25.2   BUN  mg/dL 49* 44* 46*   CREATININE mg/dL 2.54* 2.64* 3.15*   CALCIUM mg/dL 9.5 9.4 9.8   GLUCOSE mg/dL 86 82 104*       Estimated Creatinine Clearance: 24.1 mL/min (A) (by C-G formula based on SCr of 2.54 mg/dL (H)).    Results from last 7 days   Lab Units 10/02/23  0336 10/01/23  0439   PHOSPHORUS mg/dL 3.4 3.8             Results from last 7 days   Lab Units 10/03/23  0357 10/02/23  0336 10/01/23  0439 09/30/23  0349 09/29/23  0811   WBC 10*3/mm3 9.16 8.67 8.39 7.88 8.83   HEMOGLOBIN g/dL 11.4* 10.5* 11.8* 11.2* 10.0*   PLATELETS 10*3/mm3 260 232 237 242 207       Results from last 7 days   Lab Units 09/29/23  0811   INR  1.10       Assessment / Plan     ASSESSMENT:  NADIR on CKD 4, nonoliguric.  Waste products stable.  IV fluids for 8 hours after normal saline bolus in the Cath Lab.  2.  Non-ST MI, coronary artery disease.  Status post left main PCI today.  3.  Anemia CKD  4.  Hypothyroidism on replacement.  5. PAD, status post right lower extremity angioplasty stent.  PLAN:  Agree with IV fluids.  Monitor chemistries    Thank you for involving us in the care of Waldo Jameson.  Please feel free to call with any questions.    Glendy Galeas MD  10/03/23  14:30 EDT    [facility]  [phone]

## 2023-10-04 LAB
ANION GAP SERPL CALCULATED.3IONS-SCNC: 10.2 MMOL/L (ref 5–15)
BUN SERPL-MCNC: 45 MG/DL (ref 8–23)
BUN/CREAT SERPL: 18.2 (ref 7–25)
CALCIUM SPEC-SCNC: 9.6 MG/DL (ref 8.6–10.5)
CHLORIDE SERPL-SCNC: 105 MMOL/L (ref 98–107)
CO2 SERPL-SCNC: 22.8 MMOL/L (ref 22–29)
CREAT SERPL-MCNC: 2.47 MG/DL (ref 0.76–1.27)
DEPRECATED RDW RBC AUTO: 51.4 FL (ref 37–54)
EGFRCR SERPLBLD CKD-EPI 2021: 25.5 ML/MIN/1.73
ERYTHROCYTE [DISTWIDTH] IN BLOOD BY AUTOMATED COUNT: 16 % (ref 12.3–15.4)
GLUCOSE SERPL-MCNC: 75 MG/DL (ref 65–99)
HCT VFR BLD AUTO: 32.1 % (ref 37.5–51)
HGB BLD-MCNC: 10.6 G/DL (ref 13–17.7)
MCH RBC QN AUTO: 29 PG (ref 26.6–33)
MCHC RBC AUTO-ENTMCNC: 33 G/DL (ref 31.5–35.7)
MCV RBC AUTO: 87.9 FL (ref 79–97)
PLATELET # BLD AUTO: 256 10*3/MM3 (ref 140–450)
PMV BLD AUTO: 10.8 FL (ref 6–12)
POTASSIUM SERPL-SCNC: 4.2 MMOL/L (ref 3.5–5.2)
RBC # BLD AUTO: 3.65 10*6/MM3 (ref 4.14–5.8)
SODIUM SERPL-SCNC: 138 MMOL/L (ref 136–145)
WBC NRBC COR # BLD: 8.27 10*3/MM3 (ref 3.4–10.8)

## 2023-10-04 PROCEDURE — 80048 BASIC METABOLIC PNL TOTAL CA: CPT | Performed by: INTERNAL MEDICINE

## 2023-10-04 PROCEDURE — 85027 COMPLETE CBC AUTOMATED: CPT | Performed by: INTERNAL MEDICINE

## 2023-10-04 PROCEDURE — 93010 ELECTROCARDIOGRAM REPORT: CPT | Performed by: INTERNAL MEDICINE

## 2023-10-04 PROCEDURE — 99232 SBSQ HOSP IP/OBS MODERATE 35: CPT | Performed by: INTERNAL MEDICINE

## 2023-10-04 PROCEDURE — 93005 ELECTROCARDIOGRAM TRACING: CPT | Performed by: INTERNAL MEDICINE

## 2023-10-04 RX ADMIN — PAROXETINE HYDROCHLORIDE HEMIHYDRATE 20 MG: 20 TABLET, FILM COATED ORAL at 08:16

## 2023-10-04 RX ADMIN — ATORVASTATIN CALCIUM 40 MG: 20 TABLET, FILM COATED ORAL at 20:35

## 2023-10-04 RX ADMIN — CLOPIDOGREL BISULFATE 75 MG: 75 TABLET, FILM COATED ORAL at 08:00

## 2023-10-04 RX ADMIN — Medication 10 ML: at 08:05

## 2023-10-04 RX ADMIN — ZOLPIDEM TARTRATE 10 MG: 5 TABLET ORAL at 20:35

## 2023-10-04 RX ADMIN — Medication 10 ML: at 20:36

## 2023-10-04 RX ADMIN — CARVEDILOL 6.25 MG: 6.25 TABLET, FILM COATED ORAL at 08:00

## 2023-10-04 RX ADMIN — ALPRAZOLAM 0.5 MG: 0.5 TABLET ORAL at 20:40

## 2023-10-04 RX ADMIN — ALPRAZOLAM 0.5 MG: 0.5 TABLET ORAL at 13:47

## 2023-10-04 RX ADMIN — ISOSORBIDE MONONITRATE 30 MG: 30 TABLET, EXTENDED RELEASE ORAL at 08:00

## 2023-10-04 RX ADMIN — PANTOPRAZOLE SODIUM 40 MG: 40 TABLET, DELAYED RELEASE ORAL at 07:30

## 2023-10-04 RX ADMIN — ASPIRIN 81 MG: 81 TABLET, COATED ORAL at 08:00

## 2023-10-04 RX ADMIN — CARVEDILOL 6.25 MG: 6.25 TABLET, FILM COATED ORAL at 20:35

## 2023-10-04 RX ADMIN — LEVOTHYROXINE SODIUM 25 MCG: 0.03 TABLET ORAL at 08:00

## 2023-10-04 NOTE — PROGRESS NOTES
[facility] Progress Note      Patient Name: Waldo Jameson  : 1940  MRN: [id number]  Primary Care Physician:  Dahiana Youngblood MD  Date of admission: 2023    Subjective     Interval History:   Follow-up NADIR on CKD 4.  Status post left main PCI 10/3/23.   Feels fine. Did not sleep well. Feeling a little overwhelmed by all he has been through.  Eating. Hopeful for dc tomorrow. No pain. Not soa.     Review of Systems:   As noted above    Objective     Vitals:   Temp:  [97.4 °F (36.3 °C)-98.4 °F (36.9 °C)] 98 °F (36.7 °C)  Heart Rate:  [51-66] 61  Resp:  [11-18] 18  BP: ()/(42-91) 142/74  Flow (L/min):  [1-15] 1    Intake/Output Summary (Last 24 hours) at 10/4/2023 1012  Last data filed at 10/4/2023 0826  Gross per 24 hour   Intake 958 ml   Output 1200 ml   Net -242 ml         Physical Exam:    General Appearance: alert, oriented x 3, no acute distress . Up in chair .  Skin: warm and dry  HEENT: oral mucosa dry, nonicteric sclera  Neck: supple, no JVD  Lungs: Clear to auscultation bilaterally .  Heart: RRR, no S3 or rub  Abdomen: soft, nontender, nondistended.  +bs  : no palpable bladder  Extremities: no edema.  Neuro: normal speech and mental status     Scheduled Meds:     aspirin, 81 mg, Oral, Daily  atorvastatin, 40 mg, Oral, Nightly  carvedilol, 6.25 mg, Oral, Q12H  clopidogrel, 75 mg, Oral, Daily  isosorbide mononitrate, 30 mg, Oral, Daily  levothyroxine, 25 mcg, Oral, Daily  pantoprazole, 40 mg, Oral, Q AM  PARoxetine, 20 mg, Oral, Daily  senna-docusate sodium, 2 tablet, Oral, BID  sodium chloride, 10 mL, Intravenous, Q12H  zolpidem, 10 mg, Oral, Nightly      IV Meds:          Results Reviewed:   I have personally reviewed the results from the time of this admission to 10/4/2023 10:12 EDT     Results from last 7 days   Lab Units 10/04/23  0415 10/03/23  0357 10/02/23  0336   SODIUM mmol/L 138 141 139   POTASSIUM mmol/L 4.2 3.8 4.1   CHLORIDE mmol/L 105 104  104   CO2 mmol/L 22.8 24.0 22.2   BUN mg/dL 45* 49* 44*   CREATININE mg/dL 2.47* 2.54* 2.64*   CALCIUM mg/dL 9.6 9.5 9.4   GLUCOSE mg/dL 75 86 82         Estimated Creatinine Clearance: 24.6 mL/min (A) (by C-G formula based on SCr of 2.47 mg/dL (H)).    Results from last 7 days   Lab Units 10/02/23  0336 10/01/23  0439   PHOSPHORUS mg/dL 3.4 3.8               Results from last 7 days   Lab Units 10/04/23  0415 10/03/23  0357 10/02/23  0336 10/01/23  0439 09/30/23  0349   WBC 10*3/mm3 8.27 9.16 8.67 8.39 7.88   HEMOGLOBIN g/dL 10.6* 11.4* 10.5* 11.8* 11.2*   PLATELETS 10*3/mm3 256 260 232 237 242         Results from last 7 days   Lab Units 09/29/23  0811   INR  1.10         Assessment / Plan     ASSESSMENT:  NADIR on CKD 4, nonoliguric. 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 Baseline crt 2's.  Waste products better.         Euvolemic.   2.  Non-ST MI, coronary artery disease.  Status post left main PCI 10/3/23.  3.  Anemia CKD  4.  Hypothyroidism on replacement.  5. PAD, status post right lower extremity angioplasty stent.  PLAN:  Monitor chemistries  2. List seroquel as allergy .  Thank you for involving us in the care of Waldo Jameson.  Please feel free to call with any questions.    Glendy Galeas MD  10/04/23  10:12 EDT    [facility]  [phone]

## 2023-10-04 NOTE — PROGRESS NOTES
"    Patient Name: Waldo Jameson  :1940  83 y.o.      Patient Care Team:  Dahiana Youngblood MD as PCP - General (Family Medicine)    Chief Complaint: CAD    Interval History: feels well, no angina or dyspnea    Objective   Vital Signs  Temp:  [97.4 °F (36.3 °C)-98.4 °F (36.9 °C)] 98 °F (36.7 °C)  Heart Rate:  [51-66] 61  Resp:  [11-18] 18  BP: ()/(42-91) 142/74    Intake/Output Summary (Last 24 hours) at 10/4/2023 1015  Last data filed at 10/4/2023 0826  Gross per 24 hour   Intake 958 ml   Output 1200 ml   Net -242 ml     Flowsheet Rows      Flowsheet Row First Filed Value   Admission Height 175.3 cm (69\") Documented at 2023 1137   Admission Weight 75.2 kg (165 lb 12.6 oz) Documented at 2023 0642            Physical Exam:   General Appearance:    Alert, cooperative, in no acute distress   Lungs:     Clear to auscultation.  Normal respiratory effort and rate.      Heart:    Regular rhythm and normal rate, normal S1 and S2, no murmurs, gallops or rubs.     Chest Wall:    No abnormalities observed   Abdomen:     Soft, nontender, positive bowel sounds.     Extremities:   no cyanosis, clubbing or edema.  No marked joint deformities.  Adequate musculoskeletal strength.  No hematoma at groin site, some tenderness     Results Review:    Results from last 7 days   Lab Units 10/04/23  0415   SODIUM mmol/L 138   POTASSIUM mmol/L 4.2   CHLORIDE mmol/L 105   CO2 mmol/L 22.8   BUN mg/dL 45*   CREATININE mg/dL 2.47*   GLUCOSE mg/dL 75   CALCIUM mg/dL 9.6     Results from last 7 days   Lab Units 23  1001 23  0811   HSTROP T ng/L 146* 121*     Results from last 7 days   Lab Units 10/04/23  0415   WBC 10*3/mm3 8.27   HEMOGLOBIN g/dL 10.6*   HEMATOCRIT % 32.1*   PLATELETS 10*3/mm3 256     Results from last 7 days   Lab Units 23  1832 23  1001 23  0811   INR   --   --  1.10   APTT seconds 44.4* 89.8* 91.0*                       Medication Review:   aspirin, 81 mg, Oral, " Daily  atorvastatin, 40 mg, Oral, Nightly  carvedilol, 6.25 mg, Oral, Q12H  clopidogrel, 75 mg, Oral, Daily  isosorbide mononitrate, 30 mg, Oral, Daily  levothyroxine, 25 mcg, Oral, Daily  pantoprazole, 40 mg, Oral, Q AM  PARoxetine, 20 mg, Oral, Daily  senna-docusate sodium, 2 tablet, Oral, BID  sodium chloride, 10 mL, Intravenous, Q12H  zolpidem, 10 mg, Oral, Nightly                Assessment & Plan   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 Status post PCI 4x12mm Yuko SHELBI  Acute CHF Improving diuretics per renal, LVEDP was severely elevated on 9/29  NADIR on CKD  PAD, occluded right carotid, h/o b/l Iliac stenting    Successful PCI of the LM  ASA plavix forever  Lipitor  Coreg uptitrate to 12.5, imdur 30  Will need diuretic as an outpt, defer to renal the correct dose.   Likely d/c tomorrow if ok with everyone  Tx to floor     Carrie Muñoz MD  [facility]  10/04/23  10:15 EDT

## 2023-10-04 NOTE — CONSULTS
Met with patient, discussed benefits of cardiac rehab. Provided phase II information along with the contact information for cardiac rehab here at [facility]. Pt declined.

## 2023-10-05 ENCOUNTER — READMISSION MANAGEMENT (OUTPATIENT)
Dept: CALL CENTER | Facility: HOSPITAL | Age: 83
End: 2023-10-05
Payer: MEDICARE

## 2023-10-05 VITALS
DIASTOLIC BLOOD PRESSURE: 64 MMHG | BODY MASS INDEX: 25.83 KG/M2 | HEART RATE: 50 BPM | HEIGHT: 69 IN | SYSTOLIC BLOOD PRESSURE: 107 MMHG | WEIGHT: 174.38 LBS | TEMPERATURE: 97.9 F | OXYGEN SATURATION: 98 % | RESPIRATION RATE: 17 BRPM

## 2023-10-05 PROBLEM — I21.9 TYPE 1 MYOCARDIAL INFARCTION: Status: ACTIVE | Noted: 2023-10-05

## 2023-10-05 LAB
ANION GAP SERPL CALCULATED.3IONS-SCNC: 10 MMOL/L (ref 5–15)
BILIRUB UR QL STRIP: NEGATIVE
BUN SERPL-MCNC: 43 MG/DL (ref 8–23)
BUN/CREAT SERPL: 15.8 (ref 7–25)
CALCIUM SPEC-SCNC: 9.6 MG/DL (ref 8.6–10.5)
CHLORIDE SERPL-SCNC: 106 MMOL/L (ref 98–107)
CLARITY UR: CLEAR
CO2 SERPL-SCNC: 23 MMOL/L (ref 22–29)
COLOR UR: YELLOW
CREAT SERPL-MCNC: 2.72 MG/DL (ref 0.76–1.27)
CREAT UR-MCNC: 142.6 MG/DL
DEPRECATED RDW RBC AUTO: 52.2 FL (ref 37–54)
EGFRCR SERPLBLD CKD-EPI 2021: 22.8 ML/MIN/1.73
ERYTHROCYTE [DISTWIDTH] IN BLOOD BY AUTOMATED COUNT: 15.9 % (ref 12.3–15.4)
GLUCOSE SERPL-MCNC: 88 MG/DL (ref 65–99)
GLUCOSE UR STRIP-MCNC: NEGATIVE MG/DL
HCT VFR BLD AUTO: 29.2 % (ref 37.5–51)
HGB BLD-MCNC: 9.5 G/DL (ref 13–17.7)
HGB UR QL STRIP.AUTO: ABNORMAL
KETONES UR QL STRIP: NEGATIVE
LEUKOCYTE ESTERASE UR QL STRIP.AUTO: ABNORMAL
MCH RBC QN AUTO: 29 PG (ref 26.6–33)
MCHC RBC AUTO-ENTMCNC: 32.5 G/DL (ref 31.5–35.7)
MCV RBC AUTO: 89 FL (ref 79–97)
NITRITE UR QL STRIP: NEGATIVE
PH UR STRIP.AUTO: 5.5 [PH] (ref 5–8)
PLATELET # BLD AUTO: 219 10*3/MM3 (ref 140–450)
PMV BLD AUTO: 10.6 FL (ref 6–12)
POTASSIUM SERPL-SCNC: 3.9 MMOL/L (ref 3.5–5.2)
PROT UR QL STRIP: ABNORMAL
RBC # BLD AUTO: 3.28 10*6/MM3 (ref 4.14–5.8)
SODIUM SERPL-SCNC: 139 MMOL/L (ref 136–145)
SODIUM UR-SCNC: 45 MMOL/L
SP GR UR STRIP: 1.03 (ref 1–1.03)
UROBILINOGEN UR QL STRIP: ABNORMAL
WBC NRBC COR # BLD: 8.4 10*3/MM3 (ref 3.4–10.8)

## 2023-10-05 PROCEDURE — 80048 BASIC METABOLIC PNL TOTAL CA: CPT | Performed by: INTERNAL MEDICINE

## 2023-10-05 PROCEDURE — 82570 ASSAY OF URINE CREATININE: CPT | Performed by: INTERNAL MEDICINE

## 2023-10-05 PROCEDURE — 84300 ASSAY OF URINE SODIUM: CPT | Performed by: INTERNAL MEDICINE

## 2023-10-05 PROCEDURE — 99239 HOSP IP/OBS DSCHRG MGMT >30: CPT | Performed by: INTERNAL MEDICINE

## 2023-10-05 PROCEDURE — 85027 COMPLETE CBC AUTOMATED: CPT | Performed by: INTERNAL MEDICINE

## 2023-10-05 PROCEDURE — 81003 URINALYSIS AUTO W/O SCOPE: CPT | Performed by: INTERNAL MEDICINE

## 2023-10-05 RX ORDER — ATORVASTATIN CALCIUM 40 MG/1
40 TABLET, FILM COATED ORAL NIGHTLY
Qty: 90 TABLET | Refills: 3 | Status: SHIPPED | OUTPATIENT
Start: 2023-10-05 | End: 2023-10-16 | Stop reason: ALTCHOICE

## 2023-10-05 RX ORDER — CARVEDILOL 6.25 MG/1
6.25 TABLET ORAL EVERY 12 HOURS SCHEDULED
Qty: 90 TABLET | Refills: 3 | Status: SHIPPED | OUTPATIENT
Start: 2023-10-05 | End: 2023-10-16 | Stop reason: ALTCHOICE

## 2023-10-05 RX ORDER — CLOPIDOGREL BISULFATE 75 MG/1
75 TABLET ORAL DAILY
Qty: 90 TABLET | Refills: 3 | Status: SHIPPED | OUTPATIENT
Start: 2023-10-06

## 2023-10-05 RX ADMIN — PANTOPRAZOLE SODIUM 40 MG: 40 TABLET, DELAYED RELEASE ORAL at 04:37

## 2023-10-05 RX ADMIN — CARVEDILOL 6.25 MG: 6.25 TABLET, FILM COATED ORAL at 08:04

## 2023-10-05 RX ADMIN — Medication 10 ML: at 08:25

## 2023-10-05 RX ADMIN — ALPRAZOLAM 0.5 MG: 0.5 TABLET ORAL at 04:37

## 2023-10-05 RX ADMIN — ASPIRIN 81 MG: 81 TABLET, COATED ORAL at 08:04

## 2023-10-05 RX ADMIN — PAROXETINE HYDROCHLORIDE HEMIHYDRATE 20 MG: 20 TABLET, FILM COATED ORAL at 08:04

## 2023-10-05 RX ADMIN — ISOSORBIDE MONONITRATE 30 MG: 30 TABLET, EXTENDED RELEASE ORAL at 08:04

## 2023-10-05 RX ADMIN — LEVOTHYROXINE SODIUM 25 MCG: 0.03 TABLET ORAL at 08:04

## 2023-10-05 RX ADMIN — CLOPIDOGREL BISULFATE 75 MG: 75 TABLET, FILM COATED ORAL at 08:04

## 2023-10-05 NOTE — OUTREACH NOTE
Prep Survey      Flowsheet Row Responses   [facility] facility patient discharged from? [place]   Is LACE score < 7 ? No   Eligibility Readm Mgmt   Discharge diagnosis NSTEMI   Does the patient have one of the following disease processes/diagnoses(primary or secondary)? Acute MI (STEMI,NSTEMI)   Does the patient have Home health ordered? No   Is there a DME ordered? No   Prep survey completed? Yes            JAMIA HUIZAR - Registered Nurse

## 2023-10-05 NOTE — DISCHARGE SUMMARY
Hospital Discharge    Patient Name: Waldo Jameson  Age/Sex: 83 y.o. male  : 1940  MRN: [id number]    Encounter Provider: Carrie Muñoz MD  Referring Provider: No Known Provider  Place of Service: [facility] CARDIOLOGY  Patient Care Team:  Dahiana Youngblood MD as PCP - General (Family Medicine)         Date of Discharge:  10/5/2023   Date of Admit: 2023    Discharge Condition: Good  Discharge Diagnosis:    NSTEMI (non-ST elevated myocardial infarction)    Type 1 myocardial infarction      Hospital Course:   Waldo Jameson is a 83 y.o. male who presented with unstable angina, NSTEMI, new CHF, and flash pulmonary edema. He underwent cardiac cath showing  RCA and severe ostial LM disease. He was turned down by surgerydue to his multiple comorbid conditions including severe PAD and CKD.   He had an ostial LM stent using 4x12 Xience SHELBI and will be on lifelong DAPT with ASA and plavix. His Cr was rising on day of discharge and urine studies were performed, to be followed up by renal as an outpt as well. Will hold dc until urine studies result, if acceptable he will  be dc'ed today with f/u withrenal next week and myself the week after.     Objective:  Temp:  [97.7 °F (36.5 °C)-98.2 °F (36.8 °C)] 97.9 °F (36.6 °C)  Heart Rate:  [53-74] 65  Resp:  [16-20] 17  BP: (104-133)/(64-99) 104/70    Intake/Output Summary (Last 24 hours) at 10/5/2023 1104  Last data filed at 10/5/2023 0424  Gross per 24 hour   Intake 680 ml   Output 450 ml   Net 230 ml     Body mass index is 25.75 kg/m².      23  1137 10/04/23  0416 10/05/23  0424   Weight: 74.8 kg (165 lb) 74.3 kg (163 lb 12.8 oz) 79.1 kg (174 lb 6.1 oz)     Weight change: 4.8 kg (10 lb 9.3 oz)    Physical Exam:  GEN: Alert, cooperative, no distress  Head: normocephalic, atruamatic  Eyes: Normal conjunctiva and sclera, no icterus, PERRL  Neck: No JVD, normal carotid pulsation without bruits  Lungs: clear to  auscultation bilaterally, normal rate and effort  Heart: RRR, no murmurs or gallops. Normal s1 and S2.   Abdomen: Soft, nontender, normal bowel sounds  Extremities: No edema or marked deformities. Normal strength.   Skin: No rash, no cyanosis.   Pscyhciatric: Alert and Oriented x 3  Back: No Kypohsis or scoliosis.       Procedures Performed  Procedure(s):  Percutaneous Coronary Intervention  Intravascular Ultrasound  Coronary angiography  Stent SHELBI coronary       Consults:  Consults       Date and Time Order Name Status Description    9/29/2023  9:23 AM Inpatient Nephrology Consult              Pertinent Test Results:  Results from last 7 days   Lab Units 10/05/23  0429 10/04/23  0415 10/03/23  0357 10/02/23  0336 10/01/23  0439 09/30/23  0349 09/29/23  1250 09/29/23  0811   SODIUM mmol/L 139 138 141 139 140 140  --  143   POTASSIUM mmol/L 3.9 4.2 3.8 4.1 4.0 4.6  --  4.4   CHLORIDE mmol/L 106 105 104 104 103 104  --  112*   CO2 mmol/L 23.0 22.8 24.0 22.2 25.2 24.3  --  22.7   BUN mg/dL 43* 45* 49* 44* 46* 30*  --  31*   CREATININE mg/dL 2.72* 2.47* 2.54* 2.64* 3.15* 2.42* 2.05 2.40*   GLUCOSE mg/dL 88 75 86 82 104* 93  --  85   CALCIUM mg/dL 9.6 9.6 9.5 9.4 9.8 9.9  --  9.4     Results from last 7 days   Lab Units 09/29/23  1001 09/29/23  0811   HSTROP T ng/L 146* 121*     Results from last 7 days   Lab Units 10/05/23  0429 10/04/23  0415 10/03/23  0357 10/02/23  0336 10/01/23  0439 09/30/23  0349 09/29/23  0811   WBC 10*3/mm3 8.40 8.27 9.16 8.67 8.39 7.88 8.83   HEMOGLOBIN g/dL 9.5* 10.6* 11.4* 10.5* 11.8* 11.2* 10.0*   HEMATOCRIT % 29.2* 32.1* 34.5* 32.5* 36.1* 34.7* 31.2*   PLATELETS 10*3/mm3 219 256 260 232 237 242 207     Results from last 7 days   Lab Units 09/29/23  1832 09/29/23  1001 09/29/23  0811   INR   --   --  1.10   APTT seconds 44.4* 89.8* 91.0*               Invalid input(s): LDLCALC            Discharge Medications     Discharge Medications        New Medications        Instructions Start Date    atorvastatin 40 MG tablet  Commonly known as: LIPITOR   40 mg, Oral, Nightly      carvedilol 6.25 MG tablet  Commonly known as: COREG   6.25 mg, Oral, Every 12 Hours Scheduled      clopidogrel 75 MG tablet  Commonly known as: PLAVIX   75 mg, Oral, Daily   Start Date: October 6, 2023            Continue These Medications        Instructions Start Date   ALPRAZOLAM PO   0.5 mg, Oral, 2 Times Daily      Ambien 10 MG tablet  Generic drug: zolpidem   10 mg, Oral, Nightly      aspirin 81 MG EC tablet   81 mg, Oral, Daily      isosorbide mononitrate 30 MG 24 hr tablet  Commonly known as: IMDUR   30 mg, Daily      levothyroxine 25 MCG tablet  Commonly known as: SYNTHROID, LEVOTHROID   25 mcg, Oral, Daily      PARoxetine 20 MG tablet  Commonly known as: PAXIL   20 mg, Oral, Daily             Stop These Medications      amLODIPine 5 MG tablet  Commonly known as: NORVASC     omeprazole 40 MG capsule  Commonly known as: priLOSEC              Discharge Diet:    Dietary Orders (From admission, onward)       Start     Ordered    10/03/23 1204  Diet: Regular/House Diet; Texture: Regular Texture (IDDSI 7); Fluid Consistency: Thin (IDDSI 0)  Diet Effective Now        References:    Diet Order [place]   Question Answer Comment   Diets: Regular/House Diet    Texture: Regular Texture (IDDSI 7)    Fluid Consistency: Thin (IDDSI 0)        10/03/23 1203                    Activity at Discharge:       Discharge disposition: home     Discharge Instructions and Follow ups:  No future appointments.  Additional Instructions for the Follow-ups that You Need to Schedule       Ambulatory Referral to Cardiac Rehab   As directed             Follow-up Information       [facility] .    Specialty: Cardiac Rehabilitation  Contact information:  [address] [facility] [id number] [phone]             Erik Jamison MD. Go on 10/23/2023.    Specialty: Nephrology  Why: has follow up appt with Dr. Ashley  Yaquelin Oct 23 at 2:15 pm  Contact information:  [address]  [address] [address]  [phone]               Luis Eduardo Ribeiro APRN. Go on 10/11/2023.    Specialties: Nephrology, Nurse Practitioner  Why: Follow-up with [facility] Wednesday, 10/11/2023 at 1240.  Contact information:  [address] [address]  [phone]                             Test Results Pending at Discharge:      Carrie Muñoz MD  10/05/23  11:04 EDT    Time: Discharge 60 min    EMR Dragon/Transcription disclaimer:   Part of this note may be an electronic transcription/translation of spoken language to printed text using the Dragon dictation system.

## 2023-10-05 NOTE — DISCHARGE INSTRUCTIONS
Get lab work done at [facility].  Monday, October 9.  Basic metabolic panel.  Results to be faxed to [facility] fax #[phone].  Patient given written order for same.

## 2023-10-05 NOTE — PLAN OF CARE
Goal Outcome Evaluation:         Pt A/Ox4, no pain, adlib, good intake/ U/O. Getting ready for discharge. Family will pick pt up

## 2023-10-05 NOTE — PROGRESS NOTES
[facility] Progress Note      Patient Name: Waldo Jameson  : 1940  MRN: [id number]  Primary Care Physician:  Dahiana Youngblood MD  Date of admission: 2023    Subjective     Interval History:   Follow-up NADIR on CKD 4.  Status post left main PCI 10/3/23.   Feels ready for discharge but creatinine is higher today.  Urine not recorded for night shift.  Blood pressure 104/70 early this morning.   Systolic range  127-142 yesterday.  Eating well.  Urinating.  Emptying his bladder.  Bowels moving.  No edema.  Not short of air.  Review of Systems:   As noted above    Objective     Vitals:   Temp:  [97.7 °F (36.5 °C)-98.2 °F (36.8 °C)] 97.9 °F (36.6 °C)  Heart Rate:  [53-74] 65  Resp:  [16-20] 17  BP: (104-133)/(64-99) 104/70    Intake/Output Summary (Last 24 hours) at 10/5/2023 1030  Last data filed at 10/5/2023 0424  Gross per 24 hour   Intake 680 ml   Output 450 ml   Net 230 ml         Physical Exam:    General Appearance: alert, oriented x 3, no acute distress . Up in chair .  Skin: warm and dry  HEENT: oral mucosa dry, nonicteric sclera  Neck: supple, no JVD  Lungs: Clear to auscultation bilaterally .  Heart: RRR, no S3 or rub  Abdomen: soft, nontender, nondistended.  +bs  : no palpable bladder  Extremities: no edema.  Neuro: normal speech and mental status     Scheduled Meds:     aspirin, 81 mg, Oral, Daily  atorvastatin, 40 mg, Oral, Nightly  carvedilol, 6.25 mg, Oral, Q12H  clopidogrel, 75 mg, Oral, Daily  isosorbide mononitrate, 30 mg, Oral, Daily  levothyroxine, 25 mcg, Oral, Daily  pantoprazole, 40 mg, Oral, Q AM  PARoxetine, 20 mg, Oral, Daily  senna-docusate sodium, 2 tablet, Oral, BID  sodium chloride, 10 mL, Intravenous, Q12H  zolpidem, 10 mg, Oral, Nightly      IV Meds:          Results Reviewed:   I have personally reviewed the results from the time of this admission to 10/5/2023 10:30 EDT     Results from last 7 days   Lab Units 10/05/23  0429  10/04/23  0415 10/03/23  0357   SODIUM mmol/L 139 138 141   POTASSIUM mmol/L 3.9 4.2 3.8   CHLORIDE mmol/L 106 105 104   CO2 mmol/L 23.0 22.8 24.0   BUN mg/dL 43* 45* 49*   CREATININE mg/dL 2.72* 2.47* 2.54*   CALCIUM mg/dL 9.6 9.6 9.5   GLUCOSE mg/dL 88 75 86         Estimated Creatinine Clearance: 23.8 mL/min (A) (by C-G formula based on SCr of 2.72 mg/dL (H)).    Results from last 7 days   Lab Units 10/02/23  0336 10/01/23  0439   PHOSPHORUS mg/dL 3.4 3.8               Results from last 7 days   Lab Units 10/05/23  0429 10/04/23  0415 10/03/23  0357 10/02/23  0336 10/01/23  0439   WBC 10*3/mm3 8.40 8.27 9.16 8.67 8.39   HEMOGLOBIN g/dL 9.5* 10.6* 11.4* 10.5* 11.8*   PLATELETS 10*3/mm3 219 256 260 232 237         Results from last 7 days   Lab Units 09/29/23  0811   INR  1.10         Assessment / Plan     ASSESSMENT:  NADIR on CKD 4, nonoliguric. 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 Baseline crt 2's.  His creatinine is up some today.  Euvolemic by exam.  Contrast studies 9/29/2023 cardiac cath with subsequent cardiac cath and PCI on 10/3/2023.  His baseline creatinine appears to be approximately 2.4.  Did receive fluids around the time of the PCI on 10 3.  I think that he is okay to go with very close follow-up.  I have ordered labs to be drawn at [facility] as an outpatient with results faxed to [facility] with our office.  He will then see him October 11. check urinalysis and urine sodium creatinine today for completeness.  2.  Non-ST MI, coronary artery disease.  Status post left main PCI 10/3/23.  3.  Anemia CKD  4.  Hypothyroidism on replacement.  5. PAD, status post right lower extremity angioplasty stent.  PLAN:  UA, urine sodium, urine creatinine now.  Okay for discharge.  Has close follow-up arranged with lab work Monday, October 9 [facility]. basic metabolic panel to be faxed to [phone] attention [facility].  Has  follow-up with [facility] in our main office October 11 at 12:40 PM  5.  Discussed with Dr. Muñoz.  Thank you for involving us in the care of Waldo JUNIOR Jameson.  Please feel free to call with any questions.    Glendy Galeas MD  10/05/23  10:30 EDT    [facility]  [phone]

## 2023-10-06 LAB
ALPRAZ SERPL-MCNC: 11 NG/ML (ref 5–25)
ALPRAZ SERPL-MCNC: 12.6 NG/ML (ref 5–25)
ALPRAZ SERPL-MCNC: 12.6 NG/ML (ref 5–25)
ALPRAZ SERPL-MCNC: 5.9 NG/ML (ref 5–25)
ALPRAZ SERPL-MCNC: 6.1 NG/ML (ref 5–25)
ALPRAZ SERPL-MCNC: 9.4 NG/ML (ref 5–25)
QT INTERVAL: 419 MS
QTC INTERVAL: 415 MS

## 2023-10-06 NOTE — PROGRESS NOTES
[facility] Clinical Pharmacy Services: National Cardiology Data Registry (NCDR) Medication Review    Waldo Jameson is s/p PCI with drug-eluting stent placement for NSTEMI . Pharmacy to review discharge medications to make sure appropriate medications have been prescribed.    Patient has been discharged on the following:  Aspirin: 81 mg once daily  High Intensity Statin: atorvastatin 40 mg once daily  Beta-blocker: carvedilol 6.25 mg twice daily  P2Y12 Inhibitor: clopidogrel 75 mg once daily  LVEF <40: yes, however NADIR on CKD4 (SCr = 2.72 ) Not safe to start ACE/ARB at this time.    These medications meet the requirements for NCDR discharge medication for chest pain and MI.    Homer Riley [facility]  Clinical Pharmacist

## 2023-10-09 LAB — ALPRAZ SERPL-MCNC: 11 NG/ML (ref 5–25)

## 2023-10-10 ENCOUNTER — READMISSION MANAGEMENT (OUTPATIENT)
Dept: CALL CENTER | Facility: HOSPITAL | Age: 83
End: 2023-10-10
Payer: MEDICARE

## 2023-10-10 ENCOUNTER — TELEPHONE (OUTPATIENT)
Dept: CARDIOLOGY | Facility: CLINIC | Age: 83
End: 2023-10-10
Payer: MEDICARE

## 2023-10-10 NOTE — TELEPHONE ENCOUNTER
Waldo Jameson and his spouse called to schedule hospital f/u.  Chart review shows the following on patient's AVS:        Patient requesting Dr. Muñoz and not APRN.  Patient agreeable to see Dr. Muñoz on 10/16 at 10:45 am.  Provided office address and notified that Dr. Muñoz is seeing patient's on the 4th floor.    Thank you,  Deborah KAMINSKI RN  Triage Nurse [facility]   16:30 EDT

## 2023-10-10 NOTE — OUTREACH NOTE
AMI Week 1 Survey      Flowsheet Row Responses   [facility] facility patient discharged from? [place]   Does the patient have one of the following disease processes/diagnoses(primary or secondary)? Acute MI (STEMI,NSTEMI)   Week 1 attempt successful? No   Unsuccessful attempts Attempt 1            Saranya JIMENEZ - Registered Nurse

## 2023-10-12 NOTE — PROGRESS NOTES
"Enter Query Response Below      Query Response: both           If applicable, please update the problem list.   Patient: Waldo Jameson        : 1940  Account: [id number]           Admit Date: 2023        How to Respond to this query:       a. Click New Note     b. Answer query within the yellow box.                c. Update the Problem List, if applicable.    If you have any questions about this query contact me at: [email or web address]     Dr. Muñoz,    82 yo admitted 23 for \" Type 1 myocardial infarction and new CHF and flash pulmonary edema\" per Discharge summary 10/5/23.\"  Risk Factors: CKD stage IV, MGUS, PAD, and Hypertension  Clinical indicators: ECHO 23: Calculated left ventricular EF = 35.3%. Left ventricular diastolic function was normal.  Avg E/e' ratio 12.16. CT surgery progress note 10/2/23 \"acute HFrEF--EF 35% per echo.\"  Treatment:  PCI 4x12mm Yuko SHELBI. New medications at discharge included Lipitor, Coreg, and Plavix    Please clarify if patient treated/monitored for one or more of the following:    Acute HFrEF due to Type I NSTEMI  New onset HFrEF  Other- specify_____  Unable to determine    By submitting this query, we are merely seeking further clarification of documentation to accurately reflect all conditions that you are monitoring, evaluating, treating or that extend the hospitalization or utilize additional resources of care. Please utilize your independent clinical judgment when addressing the question(s) above.     This query and your response, once completed, will be entered into the legal medical record.    Sincerely,  Christi ASCENCIO RN CCDS  System Director Clinical Documentation Integrity Program     "

## 2023-10-16 ENCOUNTER — OFFICE VISIT (OUTPATIENT)
Age: 83
End: 2023-10-16
Payer: MEDICARE

## 2023-10-16 VITALS
SYSTOLIC BLOOD PRESSURE: 136 MMHG | HEIGHT: 69 IN | WEIGHT: 160.2 LBS | BODY MASS INDEX: 23.73 KG/M2 | DIASTOLIC BLOOD PRESSURE: 70 MMHG | HEART RATE: 64 BPM

## 2023-10-16 DIAGNOSIS — I25.10 CORONARY ARTERY DISEASE INVOLVING NATIVE CORONARY ARTERY OF NATIVE HEART WITHOUT ANGINA PECTORIS: Primary | ICD-10-CM

## 2023-10-16 PROCEDURE — 99213 OFFICE O/P EST LOW 20 MIN: CPT | Performed by: INTERNAL MEDICINE

## 2023-10-16 RX ORDER — AMLODIPINE BESYLATE 5 MG/1
5 TABLET ORAL DAILY
COMMUNITY
Start: 2023-09-29

## 2023-10-16 NOTE — PROGRESS NOTES
Subjective:     Encounter Date:10/16/2023      Patient ID: Waldo Jameson is a 83 y.o. male.    Chief Complaint:  HPI:   Waldo Jameson is a 83 y.o. male who presented in October 2023 to [facility]  with unstable angina/NSTEMI, new  ischemic CM, EF 35%, and flash pulmonary edema.     He underwent cardiac cath showing  RCA and severe ostial LM disease. He was turned down by surgery due to his multiple comorbid conditions including severe PAD and CKD. He had an ostial LM stent using 4x12 Xience SHELBI and will be on lifelong DAPT with ASA and plavix. Due to rising creatinine he was not discharged on a diuretic. His LV EDP at the time of PCI was low.     Today he returns for follow up. He is mildly dyspneic, but correlates this to anxiety which he feels has been worse since his hospitalization. Bp is well controlled. Has not been checking weights at home. No angina, orthopnea, PND, Le edema.         The following portions of the patient's history were reviewed and updated as appropriate: allergies, current medications, past family history, past medical history, past social history, past surgical history and problem list.     REVIEW OF SYSTEMS:   All systems reviewed.  Pertinent positives identified in HPI.  All other systems are negative.    Past Medical History:   Diagnosis Date    Heart attack     Hyperlipidemia        History reviewed. No pertinent family history.    Social History     Socioeconomic History    Marital status:    Tobacco Use    Smoking status: Every Day     Packs/day: 0.25     Years: 64.00     Additional pack years: 0.00     Total pack years: 16.00     Types: Cigarettes    Smokeless tobacco: Never   Vaping Use    Vaping Use: Never used   Substance and Sexual Activity    Alcohol use: Never    Drug use: Never       Allergies   Allergen Reactions    Iodinated Contrast Media Shortness Of Breath    Seroquel [Quetiapine] Other (See Comments)     Renal failure, thrombotic  microangiopathy on renal biopsy .       Past Surgical History:   Procedure Laterality Date    CARDIAC CATHETERIZATION Left 9/29/2023    Procedure: Cardiac Catheterization/Vascular Study;  Surgeon: Modesto Marti MD;  Location: [facility] CATH INVASIVE LOCATION;  Service: Cardiology;  Laterality: Left;  Needs to be seen by nephrology prior to cath    CARDIAC CATHETERIZATION N/A 9/29/2023    Procedure: Left Heart Cath;  Surgeon: Modesto Marti MD;  Location: [facility] CATH INVASIVE LOCATION;  Service: Cardiology;  Laterality: N/A;    CARDIAC CATHETERIZATION N/A 9/29/2023    Procedure: Coronary angiography;  Surgeon: Modesto Marti MD;  Location: [facility] CATH INVASIVE LOCATION;  Service: Cardiology;  Laterality: N/A;    CARDIAC CATHETERIZATION N/A 10/3/2023    Procedure: Percutaneous Coronary Intervention;  Surgeon: Carrie Muñoz MD;  Location: [facility] CATH INVASIVE LOCATION;  Service: Cardiology;  Laterality: N/A;  Left main intervention. groin access. IVUS.    CARDIAC CATHETERIZATION N/A 10/3/2023    Procedure: Coronary angiography;  Surgeon: Carrie Muñoz MD;  Location: [facility] CATH INVASIVE LOCATION;  Service: Cardiology;  Laterality: N/A;    CARDIAC CATHETERIZATION N/A 10/3/2023    Procedure: Stent SHELBI coronary;  Surgeon: Carrie Muñoz MD;  Location: [facility] CATH INVASIVE LOCATION;  Service: Cardiology;  Laterality: N/A;    INTERVENTIONAL RADIOLOGY PROCEDURE N/A 10/3/2023    Procedure: Intravascular Ultrasound;  Surgeon: Carrie Muñoz MD;  Location: [facility] CATH INVASIVE LOCATION;  Service: Cardiology;  Laterality: N/A;       Procedures       Objective:         PHYSICAL EXAM:  GEN: VSS, no distress,   Eyes: normal sclera, normal lids and lashes  HENT: moist mucus membranes,   Respiratory: CTAB, no rales or wheezes  CV: RRR, no murmurs, , +2 DP and 2+ carotid pulses b/l  GI: NABS, soft,  Nontender, nondistended  MSK: no edema, no scoliosis or kyphosis  Skin: no rash, warm, dry  Heme/Lymph: no bruising or  bleeding  Psych: organized thought, normal behavior and affect  Neuro: Cranial nerves grossly intact, Alert and Oriented x 3.         Assessment:          Diagnosis Plan   1. Coronary artery disease involving native coronary artery of native heart without angina pectoris               Plan:       CAD: s/p ostial Left Main PCI 2023. Life long DAPT with ASA and Plavix. Medical management of mid LAD disease with Imdur.  RCA . No angina.   Ischemic CM: EF 35%. On coreg. No ACE/ARB/ANRI due to CKD. Will need a low dose diuretic, defer to Dr. Jamison who he will see next week. Last Cr in the hospital was 2.5.   HTN controlled  CKD  PAD: B/l iliac PCI, occluded right common and internal carotid arteries  MGUS    Dr. Díaz,  thank you very much for referring this kind patient to me. Please call me with any questions or concerns. I will see the patient again in the office in 2 months.          Carrie Muñoz MD  10/16/23  [facility]    Outpatient Encounter Medications as of 10/16/2023   Medication Sig Dispense Refill    ALPRAZOLAM PO Take 0.5 mg by mouth 2 (Two) Times a Day.      amLODIPine (NORVASC) 5 MG tablet Take 1 tablet by mouth Daily.      aspirin 81 MG EC tablet Take 1 tablet by mouth Daily.      clopidogrel (PLAVIX) 75 MG tablet Take 1 tablet by mouth Daily. 90 tablet 3    isosorbide mononitrate (IMDUR) 30 MG 24 hr tablet 1 tablet Daily.      levothyroxine (SYNTHROID, LEVOTHROID) 25 MCG tablet Take 1 tablet by mouth Daily.      PARoxetine (PAXIL) 20 MG tablet Take 1 tablet by mouth Daily.      zolpidem (Ambien) 10 MG tablet Take 1 tablet by mouth Every Night.      [DISCONTINUED] atorvastatin (LIPITOR) 40 MG tablet Take 1 tablet by mouth Every Night. 90 tablet 3    [DISCONTINUED] carvedilol (COREG) 6.25 MG tablet Take 1 tablet by mouth Every 12 (Twelve) Hours. 90 tablet 3     No facility-administered encounter medications on file as of 10/16/2023.

## 2023-10-16 NOTE — LETTER
October 16, 2023     Dahiana Youngblood MD  [address]  [facility] [address]    Patient: Waldo Jameson   YOB: 1940   Date of Visit: 10/16/2023       Dear Dahiana Youngblood MD:    Thank you for referring Waldo Jameson to me for evaluation. Below are the relevant portions of my assessment and plan of care.    Encounter Diagnosis and Orders:  Diagnoses and all orders for this visit:    1. Coronary artery disease involving native coronary artery of native heart without angina pectoris (Primary)        If you have questions, please do not hesitate to call me. I look forward to following Waldo along with you.         Sincerely,        Carrie Muñoz MD        CC: Maximo Díaz MD

## 2023-10-17 ENCOUNTER — READMISSION MANAGEMENT (OUTPATIENT)
Dept: CALL CENTER | Facility: HOSPITAL | Age: 83
End: 2023-10-17
Payer: MEDICARE

## 2023-10-17 NOTE — OUTREACH NOTE
AMI Week 1 Survey      Flowsheet Row Responses   [facility] patient discharged from? [place]   Does the patient have one of the following disease processes/diagnoses(primary or secondary)? Acute MI (STEMI,NSTEMI)   Call start time 1619   Call end time 1620   Discharge diagnosis NSTEMI   Meds reviewed with patient/caregiver? Yes   Is the patient having any side effects they believe may be caused by any medication additions or changes? No   Does the patient have all prescriptions related to this admission filled (includes statins,anticoagulants,HTN meds,anti-arrhythmia meds) Yes   Is the patient taking all medications as directed (includes completed medication regime)? Yes   Does the patient have a primary care provider?  Yes   Has the patient kept scheduled appointments due by today? Yes  [Cards apt on 10/16/23]   Has home health visited the patient within 72 hours of discharge? N/A   Psychosocial issues? No   Did the patient receive a copy of their discharge instructions? Yes   Nursing interventions Reviewed instructions with patient   What is the patient's perception of their health status since discharge? Improving   Nursing interventions Nurse provided patient education   Is the patient/caregiver able to teach back signs and symptoms of when to call for help immediately: Sudden discomfort in arms, back, neck or jaw, Sudden chest discomfort, Shortness of breath at any time, Dizziness or lightheadedness   Is the patient/caregiver able to teach back lifestyle changes to help prevent MIs Quit smoking   Is the patient/caregiver able to teach back ways to prevent a second heart attack: Take medications   If the patient is a current smoker, are they able to teach back resources for cessation? [id number]   Is the patient/caregiver able to teach back the hierarchy of who to call/visit for symptoms/problems? PCP, Specialist, Home health nurse, Urgent Care, ED, 911 Yes   Revoked No further  contact(revokes)-requires comment   Graduated/Revoked comments Pt improving - doing better.  Pt had a FU apt with his cardiologist 10/16/23.   Call end time 1620            Mary H - Registered Nurse

## 2024-01-17 ENCOUNTER — TELEPHONE (OUTPATIENT)
Age: 84
End: 2024-01-17
Payer: MEDICARE

## 2024-01-17 NOTE — TELEPHONE ENCOUNTER
Dr. Diaz's office called back and let me know patient is having a left upper extremity AV graft on 2/5.    Casandra Wilder RN  [facility] Triage  01/17/24 14:08 EST

## 2024-01-17 NOTE — TELEPHONE ENCOUNTER
Rec'd fax from Dr. Renee Plasencia office w/ [facility].  P [phone], f [phone].  They are requesting preop cardiac clearance, but there is nothing on the form mentioning what the pt is having done.  I called the office and left msg w/ rec. She will give msg to Ashwini.    Kings chamorro  1/17/24  1220pm

## 2024-01-17 NOTE — TELEPHONE ENCOUNTER
Made notation of procedure and put form in Dr. Muñoz's tray to address.    Jg [facility]  1/17/24

## (undated) DEVICE — TREK CORONARY DILATATION CATHETER 3.50 MM X 12 MM / RAPID-EXCHANGE: Brand: TREK

## (undated) DEVICE — Device

## (undated) DEVICE — 6F .070 JL4 100CM: Brand: CORDIS

## (undated) DEVICE — GW EMR FIX EXCHG J STD .035 3MM 260CM

## (undated) DEVICE — NC TREK NEO™ CORONARY DILATATION CATHETER 4.00 MM X 12 MM / RAPID-EXCHANGE: Brand: NC TREK NEO™

## (undated) DEVICE — RADIFOCUS GLIDEWIRE: Brand: GLIDEWIRE

## (undated) DEVICE — CATH DIAG IMPULSE FR4 5F 100CM

## (undated) DEVICE — GLIDESHEATH SLENDER STAINLESS STEEL KIT: Brand: GLIDESHEATH SLENDER

## (undated) DEVICE — INTRO SHEATH ART/FEM ENGAGE .035 6F12CM

## (undated) DEVICE — RADIFOCUS GLIDEWIRE ADVANTAGE GUIDEWIRE: Brand: GLIDEWIRE ADVANTAGE

## (undated) DEVICE — PK CATH CARD 40

## (undated) DEVICE — CORONARY IMAGING CATHETER: Brand: OPTICROSS™ 6 HD

## (undated) DEVICE — NC TREK NEO™ CORONARY DILATATION CATHETER 4.00 MM X 8 MM / RAPID-EXCHANGE: Brand: NC TREK NEO™

## (undated) DEVICE — GUIDE CATHETER: Brand: MACH1™

## (undated) DEVICE — TREK CORONARY DILATATION CATHETER 4.0 MM X 12 MM / RAPID-EXCHANGE: Brand: TREK

## (undated) DEVICE — RUNTHROUGH NS EXTRA FLOPPY PTCA GUIDEWIRE: Brand: RUNTHROUGH

## (undated) DEVICE — GC 5F 056 JL 3.5 LBT: Brand: BRITE TIP

## (undated) DEVICE — CATH VENT MIV RADL PIG ST TIP 5F 110CM

## (undated) DEVICE — NC TREK NEO™ CORONARY DILATATION CATHETER 4.50 MM X 12 MM / RAPID-EXCHANGE: Brand: NC TREK NEO™

## (undated) DEVICE — GUIDELINER CATHETERS ARE INTENDED TO BE USED IN CONJUNCTION WITH GUIDE CATHETERS TO ACCESS DISCRETE REGIONS OF THE CORONARY AND/OR PERIPHERAL VASCULATURE, AND TO FACILITATE PLACEMENT OF INTERVENTIONAL DEVICES.: Brand: GUIDELINER® V3 CATHETER

## (undated) DEVICE — RADIFOCUS OPTITORQUE ANGIOGRAPHIC CATHETER: Brand: OPTITORQUE

## (undated) DEVICE — KT MANIFLD CARDIAC

## (undated) DEVICE — CATH DIAG IMPULSE FL4 5F 100CM

## (undated) DEVICE — DEV INDEFLATOR P/N 580289